# Patient Record
Sex: FEMALE | Race: WHITE | NOT HISPANIC OR LATINO | Employment: OTHER | URBAN - METROPOLITAN AREA
[De-identification: names, ages, dates, MRNs, and addresses within clinical notes are randomized per-mention and may not be internally consistent; named-entity substitution may affect disease eponyms.]

---

## 2017-02-28 ENCOUNTER — OFFICE VISIT (OUTPATIENT)
Dept: PULMONOLOGY | Facility: HOSPICE | Age: 72
End: 2017-02-28
Payer: COMMERCIAL

## 2017-02-28 VITALS
RESPIRATION RATE: 16 BRPM | TEMPERATURE: 98.1 F | SYSTOLIC BLOOD PRESSURE: 118 MMHG | OXYGEN SATURATION: 95 % | WEIGHT: 206 LBS | HEIGHT: 65 IN | HEART RATE: 104 BPM | BODY MASS INDEX: 34.32 KG/M2 | DIASTOLIC BLOOD PRESSURE: 78 MMHG

## 2017-02-28 DIAGNOSIS — I10 ESSENTIAL HYPERTENSION: ICD-10-CM

## 2017-02-28 DIAGNOSIS — J45.909 UNCOMPLICATED ASTHMA, UNSPECIFIED ASTHMA SEVERITY: ICD-10-CM

## 2017-02-28 DIAGNOSIS — I47.10 PAROXYSMAL SUPRAVENTRICULAR TACHYCARDIA: ICD-10-CM

## 2017-02-28 DIAGNOSIS — I27.20 PULMONARY HTN (HCC): ICD-10-CM

## 2017-02-28 DIAGNOSIS — G47.33 OBSTRUCTIVE SLEEP APNEA: ICD-10-CM

## 2017-02-28 DIAGNOSIS — R00.2 PALPITATIONS: ICD-10-CM

## 2017-02-28 PROCEDURE — 1036F TOBACCO NON-USER: CPT | Performed by: NURSE PRACTITIONER

## 2017-02-28 PROCEDURE — G8432 DEP SCR NOT DOC, RNG: HCPCS | Performed by: NURSE PRACTITIONER

## 2017-02-28 PROCEDURE — 3014F SCREEN MAMMO DOC REV: CPT | Mod: 8P | Performed by: NURSE PRACTITIONER

## 2017-02-28 PROCEDURE — 99214 OFFICE O/P EST MOD 30 MIN: CPT | Performed by: NURSE PRACTITIONER

## 2017-02-28 PROCEDURE — G8599 NO ASA/ANTIPLAT THER USE RNG: HCPCS | Performed by: NURSE PRACTITIONER

## 2017-02-28 PROCEDURE — G8419 CALC BMI OUT NRM PARAM NOF/U: HCPCS | Performed by: NURSE PRACTITIONER

## 2017-02-28 PROCEDURE — 3017F COLORECTAL CA SCREEN DOC REV: CPT | Mod: 8P | Performed by: NURSE PRACTITIONER

## 2017-02-28 PROCEDURE — 1101F PT FALLS ASSESS-DOCD LE1/YR: CPT | Mod: 8P | Performed by: NURSE PRACTITIONER

## 2017-02-28 PROCEDURE — 4040F PNEUMOC VAC/ADMIN/RCVD: CPT | Mod: 8P | Performed by: NURSE PRACTITIONER

## 2017-02-28 PROCEDURE — G8484 FLU IMMUNIZE NO ADMIN: HCPCS | Performed by: NURSE PRACTITIONER

## 2017-02-28 RX ORDER — HYDROCODONE BITARTRATE AND ACETAMINOPHEN 5; 325 MG/1; MG/1
TABLET ORAL
Refills: 0 | COMMUNITY
Start: 2017-02-23 | End: 2017-09-07

## 2017-02-28 RX ORDER — CYANOCOBALAMIN 1000 UG/ML
INJECTION, SOLUTION INTRAMUSCULAR; SUBCUTANEOUS
Refills: 0 | COMMUNITY
Start: 2016-12-06

## 2017-02-28 RX ORDER — NAPROXEN 500 MG/1
TABLET ORAL
Refills: 1 | COMMUNITY
Start: 2017-02-23 | End: 2017-09-07

## 2017-02-28 NOTE — PROGRESS NOTES
Chief Complaint   Patient presents with   • Follow-Up     opo results          HPI: This patient is a 71 y.o. female, who presents for followup of asthma and sleep apnea. She has had an extensive evaluation in the past for complaints of exertional dyspnea and cough. There was concern of pulmonary HTN, however R heart cath showed normal PAP. There was also some question of ILD. Her high resolution chest CAT scan was unremarkable. Serologies in 2014 were negative. She is a lifelong nonsmoker. Ultimately, she was started on Symbicort 80/4.5 with subjective improvement in symptoms. PFTs May 2016 are stable and indicated an FEV1 of 1.90 L 80% predicted, FEV1 FVC ratio of 78, DLCO 111% predicted. 6 minute walk showed normal oxygen saturation. Breathing remains stable. Denies symptoms of dyspnea, cough or wheeze. She denies URI since she was last seen. She rarely requires use of rescue inhaler. Her triggers are typically cold weather smoke.    In regards to CHELY, she was using auto CPAP 7-9 cm H2O. Former compliance shows excellent compliance. Repeat overnight oximetry showed borderline saturations. Pressures were empirically increased to 10-14 cm H2O. Compliance download indicates 96% compliance, average use of 6 hours 40 minutes per night, AHI is been reduced to 0.5. Overnight oximetry indicates adequate nocturnal saturation of the basal SPO2 of 90.2%. Overnight oximetry noted some cardiac irregularity. She has seen cardiology regarding this. She feels significantly better with higher CPAP pressures. She feels well rested, denies AM PALACIO, She has been getting supplies regularly.    Past Medical History   Diagnosis Date   • HTN (hypertension)    • GERD (gastroesophageal reflux disease)    • Pulmonary HTN (CMS-HCC)    • Heart burn    • Indigestion    • Hiatus hernia syndrome    • Asthma    • Breath shortness    • Snoring    • Anesthesia      jaw dislocation after bite block   • Arrhythmia      palpitations   • Sleep apnea      " uses cpap   • CHD (coronary heart disease)    • Obesity    • Hiatal hernia        Social History   Substance Use Topics   • Smoking status: Never Smoker    • Smokeless tobacco: Never Used   • Alcohol Use: Not on file      Comment: 2 per week       Family History   Problem Relation Age of Onset   • Stroke Mother        Current medications as of today   Current Outpatient Prescriptions   Medication Sig Dispense Refill   • cyanocobalamin (VITAMIN B-12) 1000 MCG/ML Solution INHECT 1 ML INTRAMUSCULARLY Q 2 WEEKS  0   • hydrocodone-acetaminophen (NORCO) 5-325 MG Tab per tablet TK 1-2 TS PO Q 6 H PRN P  0   • naproxen (NAPROSYN) 500 MG Tab TK 1 T PO BID WITH FOOD  1   • budesonide-formoterol (SYMBICORT) 80-4.5 MCG/ACT Aerosol Inhale 2 Puffs by mouth 2 Times a Day. 3 Inhaler 3   • clobetasol (TEMOVATE) 0.05 % Ointment      • metoprolol SR (TOPROL XL) 50 MG TABLET SR 24 HR Once daily at bedtime. 90 Tab 3   • Cyanocobalamin (VITAMIN B-12 INJ) by Injection route.     • lisinopril (PRINIVIL) 5 MG Tab Take 5 mg by mouth every day.     • CALCIUM PO Take  by mouth.     • MAGNESIUM PO Take  by mouth.     • Coenzyme Q10 (COQ10) 100 MG CAPS Take  by mouth every day.     • ascorbic acid (ASCORBIC ACID) 500 MG TABS Take 500 mg by mouth every day.     • albuterol (PROAIR HFA) 108 (90 BASE) MCG/ACT Aero Soln inhalation aerosol Inhale 2 Puffs by mouth every four hours as needed for Shortness of Breath (wheezing). 1 Inhaler 1   • alprazolam (XANAX) 0.25 MG TABS Take 1 Tab by mouth at bedtime as needed for Sleep. 30 Tab 3   • omeprazole (PRILOSEC) 20 MG CPDR Take 20 mg by mouth as needed.       No current facility-administered medications for this visit.       Allergies: Sulfa drugs    Blood pressure 118/78, pulse 104, temperature 36.7 °C (98.1 °F), resp. rate 16, height 1.651 m (5' 5\"), weight 93.441 kg (206 lb), SpO2 95 %.      ROS:   Constitutional: Denies fevers, chills, night sweats, weight loss or fatigue  HEENT: Denies earache, " difficulty hearing, tinnitus, nasal congestion, hoarseness  Cardiovascular: Denies chest pain, tightness, palpitations, orthopnea or edema  Respiratory: See HPI  Sleep: Denies daytime sleepiness, snoring, apneas, insomnia, morning headaches  GI: Denies heartburn, dysphagia, nausea, abdominal pain, diarrhea or constipation  : Denies frequent urination, hematuria, discharge or painful urination  Musculoskeletal: Left arm in a sling, recent GLF with hairline fracture  Neurological: Denies weakness or headaches  Skin: No rashes    Physical exam:   Appearance: Well-nourished, well-developed, in no acute distress  HEENT: Normocephalic, atraumatic, white sclera, PERRLA, oropharynx clear  Respiratory: no intercostal retractions or accessory muscle use   Lungs auscultation: Clear to auscultation bilaterally  Cardiovascular: Regular rate rhythm no murmurs, rubs or gallops  Gait: Normal  Digits: No clubbing, cyanosis  Motor: No focal deficits  Orientation: Oriented to time, person and place    Diagnosis:  1. Uncomplicated asthma, unspecified asthma severity     2. Obstructive sleep apnea     3. Essential hypertension     4. Paroxysmal supraventricular tachycardia (CMS-HCC)     5. Pulmonary HTN (CMS-HCC)     6. Palpitations         Plan:      1. Continue Symbicort 80/4.5, 2 puffs, twice a day, rinse mouth after use  2. Continue albuterol as needed  3. Continue CPAP nightly  4. Clean mask and tubing weekly  5. Follow-up in 6 months, sooner if needed

## 2017-02-28 NOTE — PATIENT INSTRUCTIONS
1. Continue Symbicort 80/4.5, 2 puffs, twice a day, rinse mouth after use  2. Continue albuterol as needed  3. Continue CPAP nightly  4. Clean mask and tubing weekly  5. Follow-up in 6 months, sooner if needed

## 2017-02-28 NOTE — MR AVS SNAPSHOT
"        Keli Porter   2017 9:20 AM   Office Visit   MRN: [id number]    Department:  [facility]   Dept Phone:  [phone]    Description:  Female : 1945   Provider:  NEL CarmonaRARNOL           Reason for Visit     Follow-Up opo results       Allergies as of 2017     Allergen Noted Reactions    Sulfa Drugs 10/01/2015   Itching, Swelling, Unspecified    Pt. States face redness, itching, swelling and fever      Vital Signs     Blood Pressure Pulse Temperature Respirations Height Weight    118/78 mmHg 104 36.7 °C (98.1 °F) 16 1.651 m (5' 5\") 93.441 kg (206 lb)    Body Mass Index Oxygen Saturation Smoking Status             34.28 kg/m2 95% Never Smoker          Basic Information     Date Of Birth Sex Race Ethnicity Preferred Language    1945 Female White Non- English      Your appointments     Mar 07, 2017 12:40 PM   FOLLOW UP with Melonie Faye A.P.NGary   [facility] (--)    [address]  [facility] [address]   [phone]              Problem List              ICD-10-CM Priority Class Noted - Resolved    HTN (hypertension) I10   Unknown - Present    GERD (gastroesophageal reflux disease) K21.9   Unknown - Present    Pulmonary HTN (CMS-HCC) I27.2   Unknown - Present    Other chest pain R07.89   2013 - Present    Palpitations R00.2   2013 - Present    Fatigue R53.83   2013 - Present    Coronary atherosclerosis of native coronary artery I25.10   2013 - Present    Carotid artery disease (CMS-HCC) I77.9   2013 - Present    Anxiety F41.9   2014 - Present    Asthma J45.909   2016 - Present    Obstructive sleep apnea G47.33   2016 - Present    Paroxysmal supraventricular tachycardia (CMS-HCC) I47.1   2016 - Present      Health Maintenance        Date Due Completion Dates    IMM DTaP/Tdap/Td Vaccine (1 - Tdap) 1963 ---    PAP SMEAR 1965 ---    MAMMOGRAM 1984 ---   " COLONOSCOPY 4/26/1994 ---    IMM ZOSTER VACCINE 4/26/2004 ---    BONE DENSITY 4/26/2009 ---    IMM PNEUMOCOCCAL 65+ (ADULT) LOW/MEDIUM RISK SERIES (1 of 2 - PCV13) 4/26/2009 ---    IMM INFLUENZA (1) 9/1/2016 ---            Current Immunizations     No immunizations on file.      Below and/or attached are the medications your provider expects you to take. Review all of your home medications and newly ordered medications with your provider and/or pharmacist. Follow medication instructions as directed by your provider and/or pharmacist. Please keep your medication list with you and share with your provider. Update the information when medications are discontinued, doses are changed, or new medications (including over-the-counter products) are added; and carry medication information at all times in the event of emergency situations     Allergies:  SULFA DRUGS - Itching,Swelling,Unspecified               Medications  Valid as of: February 28, 2017 -  9:56 AM    Generic Name Brand Name Tablet Size Instructions for use    Albuterol Sulfate (Aero Soln) albuterol 108 (90 BASE) MCG/ACT Inhale 2 Puffs by mouth every four hours as needed for Shortness of Breath (wheezing).        ALPRAZolam (Tab) XANAX 0.25 MG Take 1 Tab by mouth at bedtime as needed for Sleep.        Ascorbic Acid (Tab) ascorbic acid 500 MG Take 500 mg by mouth every day.        Budesonide-Formoterol Fumarate (Aerosol) SYMBICORT 80-4.5 MCG/ACT Inhale 2 Puffs by mouth 2 Times a Day.        Calcium   Take  by mouth.        Clobetasol Propionate (Ointment) TEMOVATE 0.05 %         Coenzyme Q10 (Cap) Coenzyme Q10 100 MG Take  by mouth every day.        Cyanocobalamin   by Injection route.        Cyanocobalamin (Solution) VITAMIN B-12 1000 MCG/ML INHECT 1 ML INTRAMUSCULARLY Q 2 WEEKS        Hydrocodone-Acetaminophen (Tab) NORCO 5-325 MG TK 1-2 TS PO Q 6 H PRN P        Lisinopril (Tab) PRINIVIL 5 MG Take 5 mg by mouth every day.        Magnesium   Take  by mouth.         Metoprolol Succinate (TABLET SR 24 HR) TOPROL XL 50 MG Once daily at bedtime.        Naproxen (Tab) NAPROSYN 500 MG TK 1 T PO BID WITH FOOD        Omeprazole (CAPSULE DELAYED RELEASE) PRILOSEC 20 MG Take 20 mg by mouth as needed.        .                 Medicines prescribed today were sent to:     [facility] - [place], [place] - [address] [facility]    [address] [facility] [address]    Phone: [phone] Fax: [phone]    Open 24 Hours?: No    [facility] [address] [place] [address] [place], [place] - [address] [facility] [address]    [address] [place] [address]    Phone: [phone] Fax: [phone]    Open 24 Hours?: No      Medication refill instructions:       If your prescription bottle indicates you have medication refills left, it is not necessary to call your provider’s office. Please contact your pharmacy and they will refill your medication.    If your prescription bottle indicates you do not have any refills left, you may request refills at any time through one of the following ways: The online [employer]ot[employer] system (except Urgent Care), by calling your provider’s office, or by asking your pharmacy to contact your provider’s office with a refill request. Medication refills are processed only during regular business hours and may not be available until the next business day. Your provider may request additional information or to have a follow-up visit with you prior to refilling your medication.   *Please Note: Medication refills are assigned a new Rx number when refilled electronically. Your pharmacy may indicate that no refills were authorized even though a new prescription for the same medication is available at the pharmacy. Please request the medicine by name with the pharmacy before contacting your provider for a refill.        Other Notes About Your Plan     [facility] Ph. [phone] Fax. [phone]           Fot[employer] Access Code: Activation code not  generated  Current MyChart Status: Active

## 2017-03-28 ENCOUNTER — OFFICE VISIT (OUTPATIENT)
Dept: CARDIOLOGY | Facility: MEDICAL CENTER | Age: 72
End: 2017-03-28
Payer: COMMERCIAL

## 2017-03-28 VITALS
OXYGEN SATURATION: 94 % | BODY MASS INDEX: 34.49 KG/M2 | WEIGHT: 207 LBS | SYSTOLIC BLOOD PRESSURE: 132 MMHG | HEART RATE: 95 BPM | HEIGHT: 65 IN | DIASTOLIC BLOOD PRESSURE: 78 MMHG

## 2017-03-28 DIAGNOSIS — I47.10 PAROXYSMAL SVT (SUPRAVENTRICULAR TACHYCARDIA): ICD-10-CM

## 2017-03-28 DIAGNOSIS — I25.10 ATHEROSCLEROSIS OF NATIVE CORONARY ARTERY OF NATIVE HEART WITHOUT ANGINA PECTORIS: ICD-10-CM

## 2017-03-28 DIAGNOSIS — R00.2 PALPITATIONS: ICD-10-CM

## 2017-03-28 DIAGNOSIS — I10 ESSENTIAL HYPERTENSION: ICD-10-CM

## 2017-03-28 PROCEDURE — G8484 FLU IMMUNIZE NO ADMIN: HCPCS | Performed by: NURSE PRACTITIONER

## 2017-03-28 PROCEDURE — 99214 OFFICE O/P EST MOD 30 MIN: CPT | Performed by: NURSE PRACTITIONER

## 2017-03-28 PROCEDURE — 1101F PT FALLS ASSESS-DOCD LE1/YR: CPT | Mod: 8P | Performed by: NURSE PRACTITIONER

## 2017-03-28 PROCEDURE — 93000 ELECTROCARDIOGRAM COMPLETE: CPT | Performed by: NURSE PRACTITIONER

## 2017-03-28 PROCEDURE — 3017F COLORECTAL CA SCREEN DOC REV: CPT | Mod: 8P | Performed by: NURSE PRACTITIONER

## 2017-03-28 PROCEDURE — 4040F PNEUMOC VAC/ADMIN/RCVD: CPT | Mod: 8P | Performed by: NURSE PRACTITIONER

## 2017-03-28 PROCEDURE — G8419 CALC BMI OUT NRM PARAM NOF/U: HCPCS | Performed by: NURSE PRACTITIONER

## 2017-03-28 PROCEDURE — G8432 DEP SCR NOT DOC, RNG: HCPCS | Performed by: NURSE PRACTITIONER

## 2017-03-28 PROCEDURE — G8599 NO ASA/ANTIPLAT THER USE RNG: HCPCS | Performed by: NURSE PRACTITIONER

## 2017-03-28 PROCEDURE — 3014F SCREEN MAMMO DOC REV: CPT | Mod: 8P | Performed by: NURSE PRACTITIONER

## 2017-03-28 PROCEDURE — 1036F TOBACCO NON-USER: CPT | Performed by: NURSE PRACTITIONER

## 2017-03-28 ASSESSMENT — ENCOUNTER SYMPTOMS
FEVER: 0
SEIZURES: 0
CHILLS: 0
HEARTBURN: 0
MUSCULOSKELETAL NEGATIVE: 1
PND: 0
DIZZINESS: 0
ABDOMINAL PAIN: 0
BLURRED VISION: 0
FOCAL WEAKNESS: 0
PALPITATIONS: 0
WHEEZING: 0
COUGH: 0
SORE THROAT: 0
LOSS OF CONSCIOUSNESS: 0
WEIGHT LOSS: 0
SPEECH CHANGE: 0
HEADACHES: 0
FLANK PAIN: 0
NAUSEA: 0
CLAUDICATION: 0
SPUTUM PRODUCTION: 0
PSYCHIATRIC NEGATIVE: 1
VOMITING: 0
SHORTNESS OF BREATH: 0
DOUBLE VISION: 0
ORTHOPNEA: 0

## 2017-03-28 NOTE — MR AVS SNAPSHOT
"        Keli Porter   3/28/2017 2:40 PM   Office Visit   MRN: [id number]    Department:  [facility]   Dept Phone:  [phone]    Description:  Female : 1945   Provider:  ROSA SegoviaPARNOL           Reason for Visit     Follow-Up           Allergies as of 3/28/2017     Allergen Noted Reactions    Sulfa Drugs 10/01/2015   Itching, Swelling, Unspecified    Pt. States face redness, itching, swelling and fever      You were diagnosed with     Essential hypertension   [4154088]       Palpitations   [785.1.ICD-9-CM]       Paroxysmal SVT (supraventricular tachycardia) (CMS-HCC)   [187578]       Atherosclerosis of native coronary artery of native heart without angina pectoris   [3018675]       Paroxysmal supraventricular tachycardia (CMS-HCC)   [427.0.ICD-9-CM]         Vital Signs     Blood Pressure Pulse Height Weight Body Mass Index Oxygen Saturation    132/78 mmHg 95 1.651 m (5' 5\") 93.895 kg (207 lb) 34.45 kg/m2 94%    Smoking Status                   Never Smoker            Basic Information     Date Of Birth Sex Race Ethnicity Preferred Language    1945 Female White Non- English      Problem List              ICD-10-CM Priority Class Noted - Resolved    HTN (hypertension) I10   Unknown - Present    GERD (gastroesophageal reflux disease) K21.9   Unknown - Present    Pulmonary HTN (CMS-HCC) I27.2   Unknown - Present    Other chest pain R07.89   2013 - Present    Palpitations R00.2   2013 - Present    Fatigue R53.83   2013 - Present    Coronary atherosclerosis of native coronary artery I25.10   2013 - Present    Carotid artery disease (CMS-HCC) I77.9   2013 - Present    Anxiety F41.9   2014 - Present    Asthma J45.909   2016 - Present    Obstructive sleep apnea G47.33   2016 - Present    Paroxysmal supraventricular tachycardia (CMS-HCC) I47.1   2016 - Present      Health Maintenance        Date Due Completion Dates    IMM DTaP/Tdap/Td Vaccine (1 " - Tdap) 4/26/1963 ---    PAP SMEAR 4/26/1965 ---    MAMMOGRAM 4/26/1984 ---    COLONOSCOPY 4/26/1994 ---    IMM ZOSTER VACCINE 4/26/2004 ---    BONE DENSITY 4/26/2009 ---    IMM PNEUMOCOCCAL 65+ (ADULT) LOW/MEDIUM RISK SERIES (1 of 2 - PCV13) 4/26/2009 ---    IMM INFLUENZA (1) 9/1/2016 ---            Results       Current Immunizations     No immunizations on file.      Below and/or attached are the medications your provider expects you to take. Review all of your home medications and newly ordered medications with your provider and/or pharmacist. Follow medication instructions as directed by your provider and/or pharmacist. Please keep your medication list with you and share with your provider. Update the information when medications are discontinued, doses are changed, or new medications (including over-the-counter products) are added; and carry medication information at all times in the event of emergency situations     Allergies:  SULFA DRUGS - Itching,Swelling,Unspecified               Medications  Valid as of: March 28, 2017 -  3:27 PM    Generic Name Brand Name Tablet Size Instructions for use    Albuterol Sulfate (Aero Soln) albuterol 108 (90 BASE) MCG/ACT Inhale 2 Puffs by mouth every four hours as needed for Shortness of Breath (wheezing).        ALPRAZolam (Tab) XANAX 0.25 MG Take 1 Tab by mouth at bedtime as needed for Sleep.        Ascorbic Acid (Tab) ascorbic acid 500 MG Take 500 mg by mouth every day.        Budesonide-Formoterol Fumarate (Aerosol) SYMBICORT 80-4.5 MCG/ACT Inhale 2 Puffs by mouth 2 Times a Day.        Calcium   Take  by mouth.        Clobetasol Propionate (Ointment) TEMOVATE 0.05 %         Coenzyme Q10 (Cap) Coenzyme Q10 100 MG Take  by mouth every day.        Cyanocobalamin   by Injection route.        Cyanocobalamin (Solution) VITAMIN B-12 1000 MCG/ML INHECT 1 ML INTRAMUSCULARLY Q 2 WEEKS        Hydrocodone-Acetaminophen (Tab) NORCO 5-325 MG TK 1-2 TS PO Q 6 H PRN P         Lisinopril (Tab) PRINIVIL 5 MG Take 5 mg by mouth every day.        Magnesium   Take  by mouth.        Metoprolol Succinate (TABLET SR 24 HR) TOPROL XL 50 MG Once daily at bedtime.        Naproxen (Tab) NAPROSYN 500 MG TK 1 T PO BID WITH FOOD        Omeprazole (CAPSULE DELAYED RELEASE) PRILOSEC 20 MG Take 20 mg by mouth as needed.        .                 Medicines prescribed today were sent to:     [facility] - [place], [place] - [address] [facility]    [address] [facility] [address]    Phone: [phone] Fax: [phone]    Open 24 Hours?: No    [facility] [address] [place] [address] [place], [place] - [address] [facility]    [address] [place] [address]    Phone: [phone] Fax: [phone]    Open 24 Hours?: No      Medication refill instructions:       If your prescription bottle indicates you have medication refills left, it is not necessary to call your provider’s office. Please contact your pharmacy and they will refill your medication.    If your prescription bottle indicates you do not have any refills left, you may request refills at any time through one of the following ways: The online [employer] system (except Urgent Care), by calling your provider’s office, or by asking your pharmacy to contact your provider’s office with a refill request. Medication refills are processed only during regular business hours and may not be available until the next business day. Your provider may request additional information or to have a follow-up visit with you prior to refilling your medication.   *Please Note: Medication refills are assigned a new Rx number when refilled electronically. Your pharmacy may indicate that no refills were authorized even though a new prescription for the same medication is available at the pharmacy. Please request the medicine by name with the pharmacy before contacting your provider for a refill.        Other Notes About Your Plan     DME-  [facility] Ph. [phone] Fax. [phone]           MyChart Access Code: Activation code not generated  Current MyChart Status: Active

## 2017-03-28 NOTE — Clinical Note
[facility]   [address] [address], [facility] [address]  [place] [address]  Phone: [phone]  Fax: [phone]              Keli Porter  1945    Encounter Date: 3/28/2017    OCTAVIO Segovia          PROGRESS NOTE:  Subjective:   Keli Porter is a 71 y.o. female who presents today for review of her PSVT, secondary hypertension, obstructive sleep apnea and chest pain.  She is followed by [facility] for her PAH and recent testing demonstrated a FEV1 at 80% other indices were normal her DLCO was 111%.  She is overweight at least 50-60# and deconditioned.  Has H/O GERD and she has noted nausea and dizziness with yoga. She is sedentary due to fracture of her arm after slipping on the ice in January. She also contracted Influenza and then Noro virus. She is back on her prudent diet attempting to limit sugar and utilization of only good fats. She is followed by [facility] and recent OPO at 10-11cms on her CPAP revealed saturations in the 90% range.    In 2014 she underwent right and left heart cath. Her PAP were mild as compared to moderate 50mmHg by echo.  She had normal coronary arteries.  She still with activity gets some sharp chest pressure.        Keli Porter is a 71 y.o. female who presents today for review of her EKG completed in Urgent care and follow up in regard t her PAH.  She is followed by [facility] for her PAH and recent testing demonstrated a FEV1 at 80% other indices were normal her DLCO was 111%.  She is overweight at least 50-60# and deconditioned.  Has H/O GERD and she has noted nausea and dizziness with yoga. She is sedentary due to increased SOB , nausea and some lightheadedness. She went to Urgent care last week with complaints of weakness and erratic heart rhythm. She had not noted this previously. EKG demonstrates isolated APC's.  In 2014 she underwent right and left heart cath. Her PAP were mild as compared to moderate 50mmHg by echo.  She had normal  coronary arteries.  She still with activity gets some sharp chest pressure.    6/22/16 Carotid Ultrasound  FINDINGS   Right carotid - Very mild plaque of the carotid bifurcation. Doppler    velocities are normal.      Left carotid - Very mild plaque of the carotid bifurcation. Doppler    velocities are normal.     Bilateral subclavian and vertebral artery waveforms are antegrade and    waveforms are normal in character and velocity.     6/22/16    Transthoracic  Echo Report      Echocardiography Laboratory    CONCLUSIONS  Normal left ventricular systolic function.  Left ventricular ejection fraction is visually estimated to be 65%.  Grade I diastolic dysfunction.  The right ventricle was normal in size and function.  No significant valve disease or flow abnormalities.   No prior study is available for comparison.   FINDINGS   Right carotid - Very mild plaque of the carotid bifurcation. Doppler    velocities are normal.          Past Medical History   Diagnosis Date   • HTN (hypertension)    • GERD (gastroesophageal reflux disease)    • Pulmonary HTN (CMS-HCC)    • Heart burn    • Indigestion    • Hiatus hernia syndrome    • Asthma    • Breath shortness    • Snoring    • Anesthesia      jaw dislocation after bite block   • Arrhythmia      palpitations   • Sleep apnea      uses cpap   • CHD (coronary heart disease)    • Obesity    • Hiatal hernia      Past Surgical History   Procedure Laterality Date   • Rula by laparoscopy     • Endoscopy     • Colonoscopy     • Recovery  10/15/2014     Performed by Cath-Recovery Surgery at SURGERY SAME DAY [facility] ORS   • Lumpectomy     • Tonsillectomy       Family History   Problem Relation Age of Onset   • Stroke Mother      History   Smoking status   • Never Smoker    Smokeless tobacco   • Never Used     Allergies   Allergen Reactions   • Sulfa Drugs Itching, Swelling and Unspecified     Pt. States face redness, itching, swelling and fever     Outpatient Encounter Prescriptions  as of 3/28/2017   Medication Sig Dispense Refill   • cyanocobalamin (VITAMIN B-12) 1000 MCG/ML Solution INHECT 1 ML INTRAMUSCULARLY Q 2 WEEKS  0   • hydrocodone-acetaminophen (NORCO) 5-325 MG Tab per tablet TK 1-2 TS PO Q 6 H PRN P  0   • naproxen (NAPROSYN) 500 MG Tab TK 1 T PO BID WITH FOOD  1   • budesonide-formoterol (SYMBICORT) 80-4.5 MCG/ACT Aerosol Inhale 2 Puffs by mouth 2 Times a Day. 3 Inhaler 3   • clobetasol (TEMOVATE) 0.05 % Ointment      • metoprolol SR (TOPROL XL) 50 MG TABLET SR 24 HR Once daily at bedtime. (Patient taking differently: 25 mg. Once daily at bedtime.) 90 Tab 3   • Cyanocobalamin (VITAMIN B-12 INJ) by Injection route.     • lisinopril (PRINIVIL) 5 MG Tab Take 5 mg by mouth every day.     • albuterol (PROAIR HFA) 108 (90 BASE) MCG/ACT Aero Soln inhalation aerosol Inhale 2 Puffs by mouth every four hours as needed for Shortness of Breath (wheezing). 1 Inhaler 1   • CALCIUM PO Take  by mouth.     • MAGNESIUM PO Take  by mouth.     • alprazolam (XANAX) 0.25 MG TABS Take 1 Tab by mouth at bedtime as needed for Sleep. 30 Tab 3   • omeprazole (PRILOSEC) 20 MG CPDR Take 20 mg by mouth as needed.     • Coenzyme Q10 (COQ10) 100 MG CAPS Take  by mouth every day.     • ascorbic acid (ASCORBIC ACID) 500 MG TABS Take 500 mg by mouth every day.       No facility-administered encounter medications on file as of 3/28/2017.     Review of Systems   Constitutional: Negative for fever, chills, weight loss and malaise/fatigue.   HENT: Negative for congestion and sore throat.    Eyes: Negative for blurred vision and double vision.   Respiratory: Negative for cough, sputum production, shortness of breath and wheezing.    Cardiovascular: Negative for chest pain, palpitations, orthopnea, claudication, leg swelling and PND.   Gastrointestinal: Negative for heartburn, nausea, vomiting and abdominal pain.   Genitourinary: Negative for dysuria, frequency and flank pain.   Musculoskeletal: Negative.    Skin:  "Negative.    Neurological: Negative for dizziness, speech change, focal weakness, seizures, loss of consciousness and headaches.   Endo/Heme/Allergies: Negative.    Psychiatric/Behavioral: Negative.         Objective:   /78 mmHg  Pulse 95  Ht 1.651 m (5' 5\")  Wt 93.895 kg (207 lb)  BMI 34.45 kg/m2  SpO2 94%    Physical Exam   Constitutional: She is oriented to person, place, and time. She appears well-developed and well-nourished.   HENT:   Head: Normocephalic and atraumatic.   Neck: Normal range of motion. Neck supple. No thyromegaly present.   Cardiovascular: Normal rate, regular rhythm, normal heart sounds and intact distal pulses.  Exam reveals no gallop and no friction rub.    No murmur heard.  Pulmonary/Chest: Effort normal and breath sounds normal. No respiratory distress. She has no wheezes. She has no rales. She exhibits no tenderness.   Abdominal: Soft. Bowel sounds are normal. She exhibits no distension. There is no tenderness. There is no guarding.   Musculoskeletal: Normal range of motion. She exhibits no edema.   Neurological: She is alert and oriented to person, place, and time.   Skin: Skin is dry.   Psychiatric: She has a normal mood and affect.       Assessment:     1. Essential hypertension  EKG   2. Palpitations  EKG   3. Paroxysmal SVT (supraventricular tachycardia) (CMS-HCC)  EKG   4. Atherosclerosis of native coronary artery of native heart without angina pectoris         Medical Decision Making:  Today's Assessment / Status / Plan:     1. HBP stable and controlled.  2. Palpitations and PSVT quiescent on current dose of Metoprolol XL 25 mgs at HS.  3. Minimal CAD per cath results. PAH.  RTC in 6 months to see DR Ya as new patient.    Collaborating MD Ya.        Fam Reed M.D.  [address]  [address]  [facility] [address]  VIA Facsimile: [phone]                   "

## 2017-03-28 NOTE — PROGRESS NOTES
Subjective:   Keli Porter is a 71 y.o. female who presents today for review of her PSVT, secondary hypertension, obstructive sleep apnea and chest pain.  She is followed by [facility] for her PAH and recent testing demonstrated a FEV1 at 80% other indices were normal her DLCO was 111%.  She is overweight at least 50-60# and deconditioned.  Has H/O GERD and she has noted nausea and dizziness with yoga. She is sedentary due to fracture of her arm after slipping on the ice in January. She also contracted Influenza and then Noro virus. She is back on her prudent diet attempting to limit sugar and utilization of only good fats. She is followed by [facility] and recent OPO at 10-11cms on her CPAP revealed saturations in the 90% range.    In 2014 she underwent right and left heart cath. Her PAP were mild as compared to moderate 50mmHg by echo.  She had normal coronary arteries.  She still with activity gets some sharp chest pressure.        Keli Porter is a 71 y.o. female who presents today for review of her EKG completed in Urgent care and follow up in regard t her PAH.  She is followed by [facility] for her PAH and recent testing demonstrated a FEV1 at 80% other indices were normal her DLCO was 111%.  She is overweight at least 50-60# and deconditioned.  Has H/O GERD and she has noted nausea and dizziness with yoga. She is sedentary due to increased SOB , nausea and some lightheadedness. She went to Urgent care last week with complaints of weakness and erratic heart rhythm. She had not noted this previously. EKG demonstrates isolated APC's.  In 2014 she underwent right and left heart cath. Her PAP were mild as compared to moderate 50mmHg by echo.  She had normal coronary arteries.  She still with activity gets some sharp chest pressure.    6/22/16 Carotid Ultrasound  FINDINGS   Right carotid - Very mild plaque of the carotid bifurcation. Doppler    velocities are normal.      Left carotid - Very mild plaque of the carotid  bifurcation. Doppler    velocities are normal.     Bilateral subclavian and vertebral artery waveforms are antegrade and    waveforms are normal in character and velocity.     6/22/16    Transthoracic  Echo Report      Echocardiography Laboratory    CONCLUSIONS  Normal left ventricular systolic function.  Left ventricular ejection fraction is visually estimated to be 65%.  Grade I diastolic dysfunction.  The right ventricle was normal in size and function.  No significant valve disease or flow abnormalities.   No prior study is available for comparison.   FINDINGS   Right carotid - Very mild plaque of the carotid bifurcation. Doppler    velocities are normal.          Past Medical History   Diagnosis Date   • HTN (hypertension)    • GERD (gastroesophageal reflux disease)    • Pulmonary HTN (CMS-HCC)    • Heart burn    • Indigestion    • Hiatus hernia syndrome    • Asthma    • Breath shortness    • Snoring    • Anesthesia      jaw dislocation after bite block   • Arrhythmia      palpitations   • Sleep apnea      uses cpap   • CHD (coronary heart disease)    • Obesity    • Hiatal hernia      Past Surgical History   Procedure Laterality Date   • Rula by laparoscopy     • Endoscopy     • Colonoscopy     • Recovery  10/15/2014     Performed by Cath-Recovery Surgery at SURGERY SAME DAY [facility] ORS   • Lumpectomy     • Tonsillectomy       Family History   Problem Relation Age of Onset   • Stroke Mother      History   Smoking status   • Never Smoker    Smokeless tobacco   • Never Used     Allergies   Allergen Reactions   • Sulfa Drugs Itching, Swelling and Unspecified     Pt. States face redness, itching, swelling and fever     Outpatient Encounter Prescriptions as of 3/28/2017   Medication Sig Dispense Refill   • cyanocobalamin (VITAMIN B-12) 1000 MCG/ML Solution INHECT 1 ML INTRAMUSCULARLY Q 2 WEEKS  0   • hydrocodone-acetaminophen (NORCO) 5-325 MG Tab per tablet TK 1-2 TS PO Q 6 H PRN P  0   • naproxen (NAPROSYN) 500  "MG Tab TK 1 T PO BID WITH FOOD  1   • budesonide-formoterol (SYMBICORT) 80-4.5 MCG/ACT Aerosol Inhale 2 Puffs by mouth 2 Times a Day. 3 Inhaler 3   • clobetasol (TEMOVATE) 0.05 % Ointment      • metoprolol SR (TOPROL XL) 50 MG TABLET SR 24 HR Once daily at bedtime. (Patient taking differently: 25 mg. Once daily at bedtime.) 90 Tab 3   • Cyanocobalamin (VITAMIN B-12 INJ) by Injection route.     • lisinopril (PRINIVIL) 5 MG Tab Take 5 mg by mouth every day.     • albuterol (PROAIR HFA) 108 (90 BASE) MCG/ACT Aero Soln inhalation aerosol Inhale 2 Puffs by mouth every four hours as needed for Shortness of Breath (wheezing). 1 Inhaler 1   • CALCIUM PO Take  by mouth.     • MAGNESIUM PO Take  by mouth.     • alprazolam (XANAX) 0.25 MG TABS Take 1 Tab by mouth at bedtime as needed for Sleep. 30 Tab 3   • omeprazole (PRILOSEC) 20 MG CPDR Take 20 mg by mouth as needed.     • Coenzyme Q10 (COQ10) 100 MG CAPS Take  by mouth every day.     • ascorbic acid (ASCORBIC ACID) 500 MG TABS Take 500 mg by mouth every day.       No facility-administered encounter medications on file as of 3/28/2017.     Review of Systems   Constitutional: Negative for fever, chills, weight loss and malaise/fatigue.   HENT: Negative for congestion and sore throat.    Eyes: Negative for blurred vision and double vision.   Respiratory: Negative for cough, sputum production, shortness of breath and wheezing.    Cardiovascular: Negative for chest pain, palpitations, orthopnea, claudication, leg swelling and PND.   Gastrointestinal: Negative for heartburn, nausea, vomiting and abdominal pain.   Genitourinary: Negative for dysuria, frequency and flank pain.   Musculoskeletal: Negative.    Skin: Negative.    Neurological: Negative for dizziness, speech change, focal weakness, seizures, loss of consciousness and headaches.   Endo/Heme/Allergies: Negative.    Psychiatric/Behavioral: Negative.         Objective:   /78 mmHg  Pulse 95  Ht 1.651 m (5' 5\")  Wt " 93.895 kg (207 lb)  BMI 34.45 kg/m2  SpO2 94%    Physical Exam   Constitutional: She is oriented to person, place, and time. She appears well-developed and well-nourished.   HENT:   Head: Normocephalic and atraumatic.   Neck: Normal range of motion. Neck supple. No thyromegaly present.   Cardiovascular: Normal rate, regular rhythm, normal heart sounds and intact distal pulses.  Exam reveals no gallop and no friction rub.    No murmur heard.  Pulmonary/Chest: Effort normal and breath sounds normal. No respiratory distress. She has no wheezes. She has no rales. She exhibits no tenderness.   Abdominal: Soft. Bowel sounds are normal. She exhibits no distension. There is no tenderness. There is no guarding.   Musculoskeletal: Normal range of motion. She exhibits no edema.   Neurological: She is alert and oriented to person, place, and time.   Skin: Skin is dry.   Psychiatric: She has a normal mood and affect.       Assessment:     1. Essential hypertension  EKG   2. Palpitations  EKG   3. Paroxysmal SVT (supraventricular tachycardia) (CMS-HCC)  EKG   4. Atherosclerosis of native coronary artery of native heart without angina pectoris         Medical Decision Making:  Today's Assessment / Status / Plan:     1. HBP stable and controlled.  2. Palpitations and PSVT quiescent on current dose of Metoprolol XL 25 mgs at HS.  3. Minimal CAD per cath results. PAH.  RTC in 6 months to see DR Ya as new patient.    Collaborating MD Ya.

## 2017-03-29 LAB — EKG IMPRESSION: NORMAL

## 2017-06-06 ENCOUNTER — TELEPHONE (OUTPATIENT)
Dept: PULMONOLOGY | Facility: HOSPICE | Age: 72
End: 2017-06-06

## 2017-06-06 DIAGNOSIS — G47.33 OBSTRUCTIVE SLEEP APNEA: ICD-10-CM

## 2017-06-21 ENCOUNTER — TELEPHONE (OUTPATIENT)
Dept: PULMONOLOGY | Facility: HOSPICE | Age: 72
End: 2017-06-21

## 2017-06-21 DIAGNOSIS — G47.33 OSA (OBSTRUCTIVE SLEEP APNEA): ICD-10-CM

## 2017-07-25 ENCOUNTER — OFFICE VISIT (OUTPATIENT)
Dept: PULMONOLOGY | Facility: HOSPICE | Age: 72
End: 2017-07-25
Payer: COMMERCIAL

## 2017-07-25 VITALS
HEIGHT: 65 IN | HEART RATE: 96 BPM | BODY MASS INDEX: 33.82 KG/M2 | SYSTOLIC BLOOD PRESSURE: 110 MMHG | WEIGHT: 203 LBS | RESPIRATION RATE: 15 BRPM | DIASTOLIC BLOOD PRESSURE: 70 MMHG | OXYGEN SATURATION: 98 %

## 2017-07-25 DIAGNOSIS — J45.30 MILD PERSISTENT ASTHMA WITHOUT COMPLICATION: ICD-10-CM

## 2017-07-25 DIAGNOSIS — I10 ESSENTIAL HYPERTENSION: ICD-10-CM

## 2017-07-25 DIAGNOSIS — G47.33 OBSTRUCTIVE SLEEP APNEA: ICD-10-CM

## 2017-07-25 PROCEDURE — 99213 OFFICE O/P EST LOW 20 MIN: CPT | Performed by: NURSE PRACTITIONER

## 2017-07-25 NOTE — MR AVS SNAPSHOT
"        Keli Porter   2017 11:20 AM   Office Visit   MRN: [id number]    Department:  [facility]   Dept Phone:  [phone]    Description:  Female : 1945   Provider:  KIRNA Carmona           Reason for Visit     Follow-Up Complience DL      Allergies as of 2017     Allergen Noted Reactions    Sulfa Drugs 10/01/2015   Itching, Swelling, Unspecified    Pt. States face redness, itching, swelling and fever      Vital Signs     Blood Pressure Pulse Respirations Height Weight Body Mass Index    110/70 mmHg 96 15 1.651 m (5' 5\") 92.08 kg (203 lb) 33.78 kg/m2    Oxygen Saturation Smoking Status                98% Never Smoker           Basic Information     Date Of Birth Sex Race Ethnicity Preferred Language    1945 Female White Non- English      Your appointments     2017  2:40 PM   FOLLOW UP with OCTAVIO Segovia   [facility] (--)    [address]  [place] [address]   [phone]            Sep 25, 2017  9:00 AM   FOLLOW UP with Kathie Ya M.D.   [facility] (--)    [address]  [place] [address]   [phone]            Rojas 15, 2018  9:20 AM   Established Patient Pul with KIRAN Carmona   [facility] (--)    [address]  [place] [id number] [phone]              Problem List              ICD-10-CM Priority Class Noted - Resolved    HTN (hypertension) I10   Unknown - Present    GERD (gastroesophageal reflux disease) K21.9   Unknown - Present    Pulmonary HTN (CMS-HCC) I27.2   Unknown - Present    Other chest pain R07.89   2013 - Present    Palpitations R00.2   2013 - Present    Fatigue R53.83   2013 - Present    Coronary atherosclerosis of native coronary artery I25.10   2013 - Present    Carotid artery disease (CMS-HCC) I77.9   2013 - Present    Anxiety F41.9   " 12/31/2014 - Present    Asthma J45.909   4/14/2016 - Present    Obstructive sleep apnea G47.33   5/11/2016 - Present    Paroxysmal supraventricular tachycardia (CMS-HCC) I47.1   12/6/2016 - Present      Health Maintenance        Date Due Completion Dates    IMM DTaP/Tdap/Td Vaccine (1 - Tdap) 4/26/1963 ---    PAP SMEAR 4/26/1965 ---    MAMMOGRAM 4/26/1984 ---    COLONOSCOPY 4/26/1994 ---    IMM ZOSTER VACCINE 4/26/2004 ---    BONE DENSITY 4/26/2009 ---    IMM PNEUMOCOCCAL 65+ (ADULT) LOW/MEDIUM RISK SERIES (1 of 2 - PCV13) 4/26/2009 ---    IMM INFLUENZA (1) 9/1/2017 ---            Current Immunizations     Influenza TIV (IM) 10/28/2016      Below and/or attached are the medications your provider expects you to take. Review all of your home medications and newly ordered medications with your provider and/or pharmacist. Follow medication instructions as directed by your provider and/or pharmacist. Please keep your medication list with you and share with your provider. Update the information when medications are discontinued, doses are changed, or new medications (including over-the-counter products) are added; and carry medication information at all times in the event of emergency situations     Allergies:  SULFA DRUGS - Itching,Swelling,Unspecified               Medications  Valid as of: July 25, 2017 - 12:05 PM    Generic Name Brand Name Tablet Size Instructions for use    Albuterol Sulfate (Aero Soln) albuterol 108 (90 BASE) MCG/ACT Inhale 2 Puffs by mouth every four hours as needed for Shortness of Breath (wheezing).        ALPRAZolam (Tab) XANAX 0.25 MG Take 1 Tab by mouth at bedtime as needed for Sleep.        Ascorbic Acid (Tab) ascorbic acid 500 MG Take 500 mg by mouth every day.        Budesonide-Formoterol Fumarate (Aerosol) SYMBICORT 80-4.5 MCG/ACT Inhale 2 Puffs by mouth 2 Times a Day.        Calcium   Take  by mouth.        Clobetasol Propionate (Ointment) TEMOVATE 0.05 %         Coenzyme Q10 (Cap)  Coenzyme Q10 100 MG Take  by mouth every day.        Cyanocobalamin   by Injection route.        Cyanocobalamin (Solution) VITAMIN B-12 1000 MCG/ML INHECT 1 ML INTRAMUSCULARLY Q 2 WEEKS        Hydrocodone-Acetaminophen (Tab) NORCO 5-325 MG TK 1-2 TS PO Q 6 H PRN P        Lisinopril (Tab) PRINIVIL 5 MG Take 5 mg by mouth every day.        Magnesium   Take  by mouth.        Metoprolol Succinate (TABLET SR 24 HR) TOPROL XL 50 MG Once daily at bedtime.        Naproxen (Tab) NAPROSYN 500 MG TK 1 T PO BID WITH FOOD        Omeprazole (CAPSULE DELAYED RELEASE) PRILOSEC 20 MG Take 20 mg by mouth as needed.        .                 Medicines prescribed today were sent to:     [facility] - [address] [address]    Phone: [phone] Fax: [phone]    Open 24 Hours?: No    [employer] [facility] [address] [place] [address] [place] [address] [address] AT [facility] [place] [address] [place] [address]    Phone: [phone] Fax: [phone]    Open 24 Hours?: No      Medication refill instructions:       If your prescription bottle indicates you have medication refills left, it is not necessary to call your provider’s office. Please contact your pharmacy and they will refill your medication.    If your prescription bottle indicates you do not have any refills left, you may request refills at any time through one of the following ways: The online [employer] system (except Urgent Care), by calling your provider’s office, or by asking your pharmacy to contact your provider’s office with a refill request. Medication refills are processed only during regular business hours and may not be available until the next business day. Your provider may request additional information or to have a follow-up visit with you prior to refilling your medication.   *Please Note: Medication refills are assigned a new Rx number when refilled electronically. Your pharmacy may  indicate that no refills were authorized even though a new prescription for the same medication is available at the pharmacy. Please request the medicine by name with the pharmacy before contacting your provider for a refill.        Other Notes About Your Plan     [facility] Ph. [phone] Fax. [phone]           MyChart Access Code: Activation code not generated  Current MyChart Status: Active

## 2017-07-25 NOTE — PROGRESS NOTES
Chief Complaint   Patient presents with   • Follow-Up     Complience DL         HPI: This patient is a 72 y.o. female, who presents for follow-up mild asthma and CHELY. She is a never smoker. Medical history includes HTN, GERD. Questionable pulmonary hypertension however right heart catheter showed normal PAP.    In regards to asthma/mild obstructive airway, PFTs indicate an FEV1 of 1.90 L 80% predicted, FEV1 FVC ratio of 78, DLCO 111% predicted. At one point there was some question of ILD, Former HRCT was unremarkable. Former serologies were negative. Patient is compliant with Symbicort 80/4.5, 2 puffs, twice a day with subjective improvement in symptoms. She still has mild dyspnea. Denies other respiratory complaints. Denies URI since she was last seen. She feels she breaths easier at lower altitudes. Her  has samples of Breo 100/25,  which were not effective for him. She is wondering if she can try these.    In regards to CHELY, she is compliant with auto CPAP 10-14 cm H2O. Compliance report shows 83% use over the past 30 days, average use of almost 7 hours per night, AHI has been reduced to 0.2. Former overnight oximetry indicates adequate nocturnal saturation. She continues to have some fatigue, but believes fatigue is related to multiple issues. She denies a.m. headaches. Mask and pressures are comfortable. She denies frequent nocturnal awakenings or resuscitative gasps.     Past Medical History   Diagnosis Date   • HTN (hypertension)    • GERD (gastroesophageal reflux disease)    • Pulmonary HTN (CMS-HCC)    • Heart burn    • Indigestion    • Hiatus hernia syndrome    • Asthma    • Breath shortness    • Snoring    • Anesthesia      jaw dislocation after bite block   • Arrhythmia      palpitations   • Sleep apnea      uses cpap   • CHD (coronary heart disease)    • Obesity    • Hiatal hernia        Social History   Substance Use Topics   • Smoking status: Never Smoker    • Smokeless tobacco: Never Used   •  "Alcohol Use: Yes      Comment: occ.       Family History   Problem Relation Age of Onset   • Stroke Mother        Current medications as of today   Current Outpatient Prescriptions   Medication Sig Dispense Refill   • cyanocobalamin (VITAMIN B-12) 1000 MCG/ML Solution INHECT 1 ML INTRAMUSCULARLY Q 2 WEEKS  0   • naproxen (NAPROSYN) 500 MG Tab TK 1 T PO BID WITH FOOD  1   • budesonide-formoterol (SYMBICORT) 80-4.5 MCG/ACT Aerosol Inhale 2 Puffs by mouth 2 Times a Day. 3 Inhaler 3   • metoprolol SR (TOPROL XL) 50 MG TABLET SR 24 HR Once daily at bedtime. (Patient taking differently: 25 mg. Once daily at bedtime.) 90 Tab 3   • lisinopril (PRINIVIL) 5 MG Tab Take 5 mg by mouth every day.     • albuterol (PROAIR HFA) 108 (90 BASE) MCG/ACT Aero Soln inhalation aerosol Inhale 2 Puffs by mouth every four hours as needed for Shortness of Breath (wheezing). 1 Inhaler 1   • CALCIUM PO Take  by mouth.     • MAGNESIUM PO Take  by mouth.     • alprazolam (XANAX) 0.25 MG TABS Take 1 Tab by mouth at bedtime as needed for Sleep. 30 Tab 3   • Coenzyme Q10 (COQ10) 100 MG CAPS Take  by mouth every day.     • ascorbic acid (ASCORBIC ACID) 500 MG TABS Take 500 mg by mouth every day.     • hydrocodone-acetaminophen (NORCO) 5-325 MG Tab per tablet TK 1-2 TS PO Q 6 H PRN P  0   • clobetasol (TEMOVATE) 0.05 % Ointment      • Cyanocobalamin (VITAMIN B-12 INJ) by Injection route.     • omeprazole (PRILOSEC) 20 MG CPDR Take 20 mg by mouth as needed.       No current facility-administered medications for this visit.       Allergies: Sulfa drugs    Blood pressure 110/70, pulse 96, resp. rate 15, height 1.651 m (5' 5\"), weight 92.08 kg (203 lb), SpO2 98 %.      ROS:   Constitutional: Denies fevers, chills, night sweats, weight loss. Positive mild fatigue.  Eyes: Denies pain, discharge/drainage  ENT: Denies tinnitus, hearing loss, sinusitis, hoarseness, epistaxis  Allergic: Denies Allergic rhinitis or hayfever  Respiratory: See HPI  Cardiovascular: " Denies chest pain, tightness, palpitations, orthopnea or edema  Sleep: Denies snoring, resuscitative gasps, frequent nocturnal awakenings, insomnia  GI/: Denies heartburn, nausea, vomiting, urinary incontinence, hematuria  Musculoskeletal: Denies back pain, painful joints, sore muscles  Neurological: Denies vertigo or headaches  Skin: Denies rashes, lesions  Psychiatric: Denies depression or anxiety      Physical exam:   Constitutional: Well-nourished, well-developed, in no acute distress  Eyes: PERRLA  Neck: supple, no masses  Respiratory: no intercostal retractions or accessory muscle use   Lungs auscultation: Clear to auscultation bilaterally  Cardiovascular: Regular rate rhythm no murmurs, rubs or gallops  Musculoskeletal: Normal gait, no clubbing or cyanosis  Skin: No rashes or lesions  Neuro: No focal deficit, cranial nerves grossly intact  Psychiatric: Oriented to time, person and place.     Diagnosis:  1. Essential hypertension     2. Mild persistent asthma without complication     3. Obstructive sleep apnea         Plan:  1. Okay to start Breo 100/25 one actuation daily, rinse mouth after use. If ineffective resume Symbicort. She understands she should not use both Breo and Symbicort.  2. Continue albuterol as needed.  3. Continue CPAP nightly.  4. Clean mask and tubing weekly.  5. If fatigue worsens consider titration study.

## 2017-07-26 ENCOUNTER — OFFICE VISIT (OUTPATIENT)
Dept: CARDIOLOGY | Facility: MEDICAL CENTER | Age: 72
End: 2017-07-26
Payer: COMMERCIAL

## 2017-07-26 VITALS
HEIGHT: 65 IN | OXYGEN SATURATION: 96 % | BODY MASS INDEX: 34.16 KG/M2 | SYSTOLIC BLOOD PRESSURE: 126 MMHG | DIASTOLIC BLOOD PRESSURE: 72 MMHG | WEIGHT: 205 LBS | HEART RATE: 96 BPM

## 2017-07-26 DIAGNOSIS — I47.10 PSVT (PAROXYSMAL SUPRAVENTRICULAR TACHYCARDIA): ICD-10-CM

## 2017-07-26 DIAGNOSIS — G47.33 OBSTRUCTIVE SLEEP APNEA: ICD-10-CM

## 2017-07-26 DIAGNOSIS — R41.0 DISORIENTED: ICD-10-CM

## 2017-07-26 DIAGNOSIS — R53.82 CHRONIC FATIGUE: ICD-10-CM

## 2017-07-26 PROCEDURE — 93000 ELECTROCARDIOGRAM COMPLETE: CPT | Performed by: NURSE PRACTITIONER

## 2017-07-26 PROCEDURE — 99214 OFFICE O/P EST MOD 30 MIN: CPT | Performed by: NURSE PRACTITIONER

## 2017-07-26 ASSESSMENT — ENCOUNTER SYMPTOMS
FLANK PAIN: 0
SORE THROAT: 0
SEIZURES: 0
DOUBLE VISION: 0
NAUSEA: 0
HEARTBURN: 0
MUSCULOSKELETAL NEGATIVE: 1
WHEEZING: 0
DIZZINESS: 0
PALPITATIONS: 0
BLURRED VISION: 0
FEVER: 0
SHORTNESS OF BREATH: 0
FOCAL WEAKNESS: 0
WEIGHT LOSS: 0
CLAUDICATION: 0
ORTHOPNEA: 0
VOMITING: 0
SPEECH CHANGE: 0
COUGH: 0
PSYCHIATRIC NEGATIVE: 1
HEADACHES: 0
CHILLS: 0
PND: 0
LOSS OF CONSCIOUSNESS: 0
SPUTUM PRODUCTION: 0
ABDOMINAL PAIN: 0

## 2017-07-26 NOTE — PROGRESS NOTES
"Subjective:   Keli Porter is a 72 y.o. female who presents today for review of her arrhythmias and hypertensive control.  She states this AM at around 0930 hours shortly after getting up she had an episode of altered mental status change. She comments for a few minutes she wasn't sure where she was. Her  recognized that she was not feeling well but she comments I could not respond to his questions. \" I felt like I was in an alternate planet.\" She did not have weakness of extremities. She did not loose vision. She comments her left eye is her weaker eye and it did seem to have less vision. When she sat down she felt so weak that she stated I felt like I could black out. She did not loose consciousness and after the episode she took some ASA. She felt tired and had a headache. She has been feeling fatigue over the past few months.   When asked why she didn't report to ER She became tearful and stated she had recently waited 9 hours with her  in the ER and will not do that again.  She has had the following testing in the past:  She is followed by [facility] for her PAH and recent testing demonstrated a FEV1 at 80% other indices were normal her DLCO was 111%.  She is overweight at least 50-60# and deconditioned.  Has H/O GERD and she has noted nausea and dizziness with yoga. She is sedentary due to increased SOB , nausea and some lightheadedness. She has intermittent complaints of weakness and erratic heart rhythm.  EKG in the past demonstrated isolated APC's. Sinus here today.  In 2014 she underwent right and left heart cath. Her PAP were mild as compared to moderate 50mmHg by echo.  She had normal coronary arteries.  She still with activity gets some sharp chest pressure.  I had a holter completed in the past which demonstrated APC's 1 4 beat run at 185 bpm and other 6 beat run at 135 bpm. Multiple APC's through out tracing.     6/22/16 Carotid Ultrasound  FINDINGS   Right carotid - Very mild plaque of " the carotid bifurcation. Doppler    velocities are normal.      Left carotid - Very mild plaque of the carotid bifurcation. Doppler    velocities are normal.     Bilateral subclavian and vertebral artery waveforms are antegrade and    waveforms are normal in character and velocity.     6/22/16    Transthoracic  Echo Report      Echocardiography Laboratory    CONCLUSIONS  Normal left ventricular systolic function.  Left ventricular ejection fraction is visually estimated to be 65%.  Grade I diastolic dysfunction.  The right ventricle was normal in size and function.  No significant valve disease or flow abnormalities.   No prior study is available for comparison.   FINDINGS   Right carotid - Very mild plaque of the carotid bifurcation. Doppler    velocities are normal.      Left carotid - Very mild plaque of the carotid bifurcation. Doppler    velocities are normal.     Bilateral subclavian and vertebral artery waveforms are antegrade and    waveforms are normal in character and velocity.         Past Medical History   Diagnosis Date   • HTN (hypertension)    • GERD (gastroesophageal reflux disease)    • Pulmonary HTN (CMS-HCC)    • Heart burn    • Indigestion    • Hiatus hernia syndrome    • Asthma    • Breath shortness    • Snoring    • Anesthesia      jaw dislocation after bite block   • Arrhythmia      palpitations   • Sleep apnea      uses cpap   • CHD (coronary heart disease)    • Obesity    • Hiatal hernia      Past Surgical History   Procedure Laterality Date   • Rula by laparoscopy     • Endoscopy     • Colonoscopy     • Recovery  10/15/2014     Performed by Cath-Recovery Surgery at SURGERY SAME DAY [facility] ORS   • Lumpectomy     • Tonsillectomy       Family History   Problem Relation Age of Onset   • Stroke Mother      History   Smoking status   • Never Smoker    Smokeless tobacco   • Never Used     Allergies   Allergen Reactions   • Sulfa Drugs Itching, Swelling and Unspecified     Pt. States face  redness, itching, swelling and fever     Outpatient Encounter Prescriptions as of 7/26/2017   Medication Sig Dispense Refill   • cyanocobalamin (VITAMIN B-12) 1000 MCG/ML Solution INHECT 1 ML INTRAMUSCULARLY Q 2 WEEKS  0   • budesonide-formoterol (SYMBICORT) 80-4.5 MCG/ACT Aerosol Inhale 2 Puffs by mouth 2 Times a Day. 3 Inhaler 3   • clobetasol (TEMOVATE) 0.05 % Ointment      • metoprolol SR (TOPROL XL) 50 MG TABLET SR 24 HR Once daily at bedtime. (Patient taking differently: 25 mg. Once daily at bedtime.) 90 Tab 3   • lisinopril (PRINIVIL) 5 MG Tab Take 5 mg by mouth every day.     • albuterol (PROAIR HFA) 108 (90 BASE) MCG/ACT Aero Soln inhalation aerosol Inhale 2 Puffs by mouth every four hours as needed for Shortness of Breath (wheezing). 1 Inhaler 1   • CALCIUM PO Take  by mouth.     • MAGNESIUM PO Take  by mouth.     • alprazolam (XANAX) 0.25 MG TABS Take 1 Tab by mouth at bedtime as needed for Sleep. 30 Tab 3   • omeprazole (PRILOSEC) 20 MG CPDR Take 20 mg by mouth as needed.     • Coenzyme Q10 (COQ10) 100 MG CAPS Take  by mouth every day.     • ascorbic acid (ASCORBIC ACID) 500 MG TABS Take 500 mg by mouth every day.     • hydrocodone-acetaminophen (NORCO) 5-325 MG Tab per tablet TK 1-2 TS PO Q 6 H PRN P  0   • naproxen (NAPROSYN) 500 MG Tab TK 1 T PO BID WITH FOOD  1   • Cyanocobalamin (VITAMIN B-12 INJ) by Injection route.       No facility-administered encounter medications on file as of 7/26/2017.     Review of Systems   Constitutional: Negative for fever, chills, weight loss and malaise/fatigue.   HENT: Negative for congestion and sore throat.    Eyes: Negative for blurred vision and double vision.   Respiratory: Negative for cough, sputum production, shortness of breath and wheezing.    Cardiovascular: Negative for chest pain, palpitations, orthopnea, claudication, leg swelling and PND.   Gastrointestinal: Negative for heartburn, nausea, vomiting and abdominal pain.   Genitourinary: Negative for  "dysuria, frequency and flank pain.   Musculoskeletal: Negative.    Skin: Negative.    Neurological: Negative for dizziness, speech change, focal weakness, seizures, loss of consciousness and headaches.   Endo/Heme/Allergies: Negative.    Psychiatric/Behavioral: Negative.         Objective:   /72 mmHg  Pulse 96  Ht 1.651 m (5' 5\")  Wt 92.987 kg (205 lb)  BMI 34.11 kg/m2  SpO2 96%    Physical Exam   Constitutional: She is oriented to person, place, and time. She appears well-developed and well-nourished.   HENT:   Head: Normocephalic and atraumatic.   Eyes: Pupils are equal, round, and reactive to light.   Neck: Normal range of motion. Neck supple. No thyromegaly present.   Cardiovascular: Normal rate, regular rhythm, normal heart sounds and intact distal pulses.  Exam reveals no gallop and no friction rub.    No murmur heard.  Pulmonary/Chest: Effort normal and breath sounds normal. No respiratory distress. She has no wheezes. She has no rales. She exhibits no tenderness.   Abdominal: Soft. Bowel sounds are normal. She exhibits no distension. There is no tenderness. There is no guarding.   Musculoskeletal: Normal range of motion. She exhibits no edema.   Neurological: She is alert and oriented to person, place, and time.   Skin: Skin is warm and dry.   Psychiatric: She has a normal mood and affect.       Assessment:     1. PSVT (paroxysmal supraventricular tachycardia) (CMS-[facility])  EKG    MR-BRAIN-WITH & W/O    COMP METABOLIC PANEL    CBC WITH DIFFERENTIAL    HOLTER MONITOR / EVENT RECORDER   2. Disoriented  MR-BRAIN-WITH & W/O    COMP METABOLIC PANEL    CBC WITH DIFFERENTIAL    HOLTER MONITOR / EVENT RECORDER    REFERRAL TO NEUROLOGY   3. Chronic fatigue     4. Obstructive sleep apnea         Medical Decision Making:  Today's Assessment / Status / Plan:     1. PSVT episode of feeling confused and near syncopal will obtain holter monitor Zio or 21 day.  2. Episode of disorientation 45 minutes refused ER. " Obtain MRI with and without to rule out CVA/TIA.  3. Chronic fatigue received B12 shot yesterday.  4. CHELY followed by PMA as noted seen yesterday.  RTC in [facility] to see Dr Ya after monitor completed. I will call her with MR results.  I have asked her to see PCP ASAP for review of above MRI, labs etc.    Collaborating MD Lee

## 2017-07-26 NOTE — Clinical Note
"     [facility]   [address], [address]  [place] [address]  Phone: [phone]  Fax: [phone]              Keli Porter  1945    Encounter Date: 7/26/2017    OCTAVIO Segovia          PROGRESS NOTE:  Subjective:   Keli Porter is a 72 y.o. female who presents today for review of her arrhythmias and hypertensive control.  She states this AM at around 0930 hours shortly after getting up she had an episode of altered mental status change. She comments for a few minutes she wasn't sure where she was. Her  recognized that she was not feeling well but she comments I could not respond to his questions. \" I felt like I was in an alternate planet.\" She did not have weakness of extremities. She did not loose vision. She comments her left eye is her weaker eye and it did seem to have less vision. When she sat down she felt so weak that she stated I felt like I could black out. She did not loose consciousness and after the episode she took some ASA. She felt tired and had a headache. She has been feeling fatigue over the past few months.   When asked why she didn't report to ER She became tearful and stated she had recently waited 9 hours with her  in the ER and will not do that again.  She has had the following testing in the past:  She is followed by [facility] for her PAH and recent testing demonstrated a FEV1 at 80% other indices were normal her DLCO was 111%.  She is overweight at least 50-60# and deconditioned.  Has H/O GERD and she has noted nausea and dizziness with yoga. She is sedentary due to increased SOB , nausea and some lightheadedness. She has intermittent complaints of weakness and erratic heart rhythm.  EKG in the past demonstrated isolated APC's. Sinus here today.  In 2014 she underwent right and left heart cath. Her PAP were mild as compared to moderate 50mmHg by echo.  She had normal coronary arteries.  She still with activity " gets some sharp chest pressure.  I had a holter completed in the past which demonstrated APC's 1 4 beat run at 185 bpm and other 6 beat run at 135 bpm. Multiple APC's through out tracing.     6/22/16 Carotid Ultrasound  FINDINGS   Right carotid - Very mild plaque of the carotid bifurcation. Doppler    velocities are normal.      Left carotid - Very mild plaque of the carotid bifurcation. Doppler    velocities are normal.     Bilateral subclavian and vertebral artery waveforms are antegrade and    waveforms are normal in character and velocity.     6/22/16    Transthoracic  Echo Report      Echocardiography Laboratory    CONCLUSIONS  Normal left ventricular systolic function.  Left ventricular ejection fraction is visually estimated to be 65%.  Grade I diastolic dysfunction.  The right ventricle was normal in size and function.  No significant valve disease or flow abnormalities.   No prior study is available for comparison.   FINDINGS   Right carotid - Very mild plaque of the carotid bifurcation. Doppler    velocities are normal.      Left carotid - Very mild plaque of the carotid bifurcation. Doppler    velocities are normal.     Bilateral subclavian and vertebral artery waveforms are antegrade and    waveforms are normal in character and velocity.         Past Medical History   Diagnosis Date   • HTN (hypertension)    • GERD (gastroesophageal reflux disease)    • Pulmonary HTN (CMS-HCC)    • Heart burn    • Indigestion    • Hiatus hernia syndrome    • Asthma    • Breath shortness    • Snoring    • Anesthesia      jaw dislocation after bite block   • Arrhythmia      palpitations   • Sleep apnea      uses cpap   • CHD (coronary heart disease)    • Obesity    • Hiatal hernia      Past Surgical History   Procedure Laterality Date   • Rula by laparoscopy     • Endoscopy     • Colonoscopy     • Recovery  10/15/2014     Performed by Cath-Recovery Surgery at SURGERY SAME DAY [facility] ORS   • Lumpectomy     •  Tonsillectomy       Family History   Problem Relation Age of Onset   • Stroke Mother      History   Smoking status   • Never Smoker    Smokeless tobacco   • Never Used     Allergies   Allergen Reactions   • Sulfa Drugs Itching, Swelling and Unspecified     Pt. States face redness, itching, swelling and fever     Outpatient Encounter Prescriptions as of 7/26/2017   Medication Sig Dispense Refill   • cyanocobalamin (VITAMIN B-12) 1000 MCG/ML Solution INHECT 1 ML INTRAMUSCULARLY Q 2 WEEKS  0   • budesonide-formoterol (SYMBICORT) 80-4.5 MCG/ACT Aerosol Inhale 2 Puffs by mouth 2 Times a Day. 3 Inhaler 3   • clobetasol (TEMOVATE) 0.05 % Ointment      • metoprolol SR (TOPROL XL) 50 MG TABLET SR 24 HR Once daily at bedtime. (Patient taking differently: 25 mg. Once daily at bedtime.) 90 Tab 3   • lisinopril (PRINIVIL) 5 MG Tab Take 5 mg by mouth every day.     • albuterol (PROAIR HFA) 108 (90 BASE) MCG/ACT Aero Soln inhalation aerosol Inhale 2 Puffs by mouth every four hours as needed for Shortness of Breath (wheezing). 1 Inhaler 1   • CALCIUM PO Take  by mouth.     • MAGNESIUM PO Take  by mouth.     • alprazolam (XANAX) 0.25 MG TABS Take 1 Tab by mouth at bedtime as needed for Sleep. 30 Tab 3   • omeprazole (PRILOSEC) 20 MG CPDR Take 20 mg by mouth as needed.     • Coenzyme Q10 (COQ10) 100 MG CAPS Take  by mouth every day.     • ascorbic acid (ASCORBIC ACID) 500 MG TABS Take 500 mg by mouth every day.     • hydrocodone-acetaminophen (NORCO) 5-325 MG Tab per tablet TK 1-2 TS PO Q 6 H PRN P  0   • naproxen (NAPROSYN) 500 MG Tab TK 1 T PO BID WITH FOOD  1   • Cyanocobalamin (VITAMIN B-12 INJ) by Injection route.       No facility-administered encounter medications on file as of 7/26/2017.     Review of Systems   Constitutional: Negative for fever, chills, weight loss and malaise/fatigue.   HENT: Negative for congestion and sore throat.    Eyes: Negative for blurred vision and double vision.   Respiratory: Negative for cough,  "sputum production, shortness of breath and wheezing.    Cardiovascular: Negative for chest pain, palpitations, orthopnea, claudication, leg swelling and PND.   Gastrointestinal: Negative for heartburn, nausea, vomiting and abdominal pain.   Genitourinary: Negative for dysuria, frequency and flank pain.   Musculoskeletal: Negative.    Skin: Negative.    Neurological: Negative for dizziness, speech change, focal weakness, seizures, loss of consciousness and headaches.   Endo/Heme/Allergies: Negative.    Psychiatric/Behavioral: Negative.         Objective:   /72 mmHg  Pulse 96  Ht 1.651 m (5' 5\")  Wt 92.987 kg (205 lb)  BMI 34.11 kg/m2  SpO2 96%    Physical Exam   Constitutional: She is oriented to person, place, and time. She appears well-developed and well-nourished.   HENT:   Head: Normocephalic and atraumatic.   Eyes: Pupils are equal, round, and reactive to light.   Neck: Normal range of motion. Neck supple. No thyromegaly present.   Cardiovascular: Normal rate, regular rhythm, normal heart sounds and intact distal pulses.  Exam reveals no gallop and no friction rub.    No murmur heard.  Pulmonary/Chest: Effort normal and breath sounds normal. No respiratory distress. She has no wheezes. She has no rales. She exhibits no tenderness.   Abdominal: Soft. Bowel sounds are normal. She exhibits no distension. There is no tenderness. There is no guarding.   Musculoskeletal: Normal range of motion. She exhibits no edema.   Neurological: She is alert and oriented to person, place, and time.   Skin: Skin is warm and dry.   Psychiatric: She has a normal mood and affect.       Assessment:     1. PSVT (paroxysmal supraventricular tachycardia) (CMS-[facility])  EKG    MR-BRAIN-WITH & W/O    COMP METABOLIC PANEL    CBC WITH DIFFERENTIAL    HOLTER MONITOR / EVENT RECORDER   2. Disoriented  MR-BRAIN-WITH & W/O    COMP METABOLIC PANEL    CBC WITH DIFFERENTIAL    HOLTER MONITOR / EVENT RECORDER    REFERRAL TO NEUROLOGY   3. " Chronic fatigue     4. Obstructive sleep apnea         Medical Decision Making:  Today's Assessment / Status / Plan:     1. PSVT episode of feeling confused and near syncopal will obtain holter monitor Zio or 21 day.  2. Episode of disorientation 45 minutes refused ER. Obtain MRI with and without to rule out CVA/TIA.  3. Chronic fatigue received B12 shot yesterday.  4. CHELY followed by PMA as noted seen yesterday.  RTC in [facility] to see Dr Ya after monitor completed. I will call her with MR results.  I have asked her to see PCP ASAP for review of above MRI, labs etc.    Collaborating MD Jesus Reed M.D.  [address]  [address]  [facility] [place] [id number]  VIA Facsimile: [phone]

## 2017-07-26 NOTE — MR AVS SNAPSHOT
"        Keli Porter   2017 2:40 PM   Office Visit   MRN: [id number]    Department:  [facility]   Dept Phone:  [phone]    Description:  Female : 1945   Provider:  ROSA SegoviaPARNOL           Reason for Visit     Follow-Up           Allergies as of 2017     Allergen Noted Reactions    Sulfa Drugs 10/01/2015   Itching, Swelling, Unspecified    Pt. States face redness, itching, swelling and fever      You were diagnosed with     PSVT (paroxysmal supraventricular tachycardia) (CMS-HCC)   [225919]       Disoriented   [2284458]         Vital Signs     Blood Pressure Pulse Height Weight Body Mass Index Oxygen Saturation    126/72 mmHg 96 1.651 m (5' 5\") 92.987 kg (205 lb) 34.11 kg/m2 96%    Smoking Status                   Never Smoker            Basic Information     Date Of Birth Sex Race Ethnicity Preferred Language    1945 Female White Non- English      Your appointments     Sep 25, 2017  9:00 AM   FOLLOW UP with Kathie Ya M.D.   [facility] (--)    [address], [address]  [place] [address]   [phone]            Rojas 15, 2018  9:20 AM   Established Patient Pul with KIRAN Carmona   [facility] (--)    [address]  [address]  [place] [address] [phone]              Problem List              ICD-10-CM Priority Class Noted - Resolved    HTN (hypertension) I10   Unknown - Present    GERD (gastroesophageal reflux disease) K21.9   Unknown - Present    Other chest pain R07.89   2013 - Present    Palpitations R00.2   2013 - Present    Fatigue R53.83   2013 - Present    Coronary atherosclerosis of native coronary artery I25.10   2013 - Present    Carotid artery disease (CMS-HCC) I77.9   2013 - Present    Anxiety F41.9   2014 - Present    Asthma J45.909   2016 - Present    Obstructive sleep apnea G47.33   2016 - Present    Paroxysmal supraventricular " tachycardia (CMS-HCC) I47.1   12/6/2016 - Present      Health Maintenance        Date Due Completion Dates    IMM DTaP/Tdap/Td Vaccine (1 - Tdap) 4/26/1963 ---    PAP SMEAR 4/26/1965 ---    MAMMOGRAM 4/26/1984 ---    COLONOSCOPY 4/26/1994 ---    IMM ZOSTER VACCINE 4/26/2004 ---    BONE DENSITY 4/26/2009 ---    IMM PNEUMOCOCCAL 65+ (ADULT) LOW/MEDIUM RISK SERIES (1 of 2 - PCV13) 4/26/2009 ---    IMM INFLUENZA (1) 9/1/2017 10/28/2016            Results       Current Immunizations     Influenza TIV (IM) 10/28/2016      Below and/or attached are the medications your provider expects you to take. Review all of your home medications and newly ordered medications with your provider and/or pharmacist. Follow medication instructions as directed by your provider and/or pharmacist. Please keep your medication list with you and share with your provider. Update the information when medications are discontinued, doses are changed, or new medications (including over-the-counter products) are added; and carry medication information at all times in the event of emergency situations     Allergies:  SULFA DRUGS - Itching,Swelling,Unspecified               Medications  Valid as of: July 26, 2017 -  3:12 PM    Generic Name Brand Name Tablet Size Instructions for use    Albuterol Sulfate (Aero Soln) albuterol 108 (90 BASE) MCG/ACT Inhale 2 Puffs by mouth every four hours as needed for Shortness of Breath (wheezing).        ALPRAZolam (Tab) XANAX 0.25 MG Take 1 Tab by mouth at bedtime as needed for Sleep.        Ascorbic Acid (Tab) ascorbic acid 500 MG Take 500 mg by mouth every day.        Budesonide-Formoterol Fumarate (Aerosol) SYMBICORT 80-4.5 MCG/ACT Inhale 2 Puffs by mouth 2 Times a Day.        Calcium   Take  by mouth.        Clobetasol Propionate (Ointment) TEMOVATE 0.05 %         Coenzyme Q10 (Cap) Coenzyme Q10 100 MG Take  by mouth every day.        Cyanocobalamin   by Injection route.        Cyanocobalamin (Solution) VITAMIN  B-12 1000 MCG/ML INHECT 1 ML INTRAMUSCULARLY Q 2 WEEKS        Hydrocodone-Acetaminophen (Tab) NORCO 5-325 MG TK 1-2 TS PO Q 6 H PRN P        Lisinopril (Tab) PRINIVIL 5 MG Take 5 mg by mouth every day.        Magnesium   Take  by mouth.        Metoprolol Succinate (TABLET SR 24 HR) TOPROL XL 50 MG Once daily at bedtime.        Naproxen (Tab) NAPROSYN 500 MG TK 1 T PO BID WITH FOOD        Omeprazole (CAPSULE DELAYED RELEASE) PRILOSEC 20 MG Take 20 mg by mouth as needed.        .                 Medicines prescribed today were sent to:     [facility] - [place], [place] - [address]    [address] [address]    Phone: [phone] Fax: [phone]    Open 24 Hours?: No    [facility] [address] [place] [address] [place], [place] - [address] [facility]    [address] [place] [address]    Phone: [phone] Fax: [phone]    Open 24 Hours?: No      Medication refill instructions:       If your prescription bottle indicates you have medication refills left, it is not necessary to call your provider’s office. Please contact your pharmacy and they will refill your medication.    If your prescription bottle indicates you do not have any refills left, you may request refills at any time through one of the following ways: The online [email or web address] system (except Urgent Care), by calling your provider’s office, or by asking your pharmacy to contact your provider’s office with a refill request. Medication refills are processed only during regular business hours and may not be available until the next business day. Your provider may request additional information or to have a follow-up visit with you prior to refilling your medication.   *Please Note: Medication refills are assigned a new Rx number when refilled electronically. Your pharmacy may indicate that no refills were authorized even though a new prescription for the same medication is available at the pharmacy. Please  request the medicine by name with the pharmacy before contacting your provider for a refill.        Your To Do List     Future Labs/Procedures Complete By Expires    CBC WITH DIFFERENTIAL  As directed 7/26/2018    COMP METABOLIC PANEL  As directed 7/26/2018    HOLTER MONITOR / EVENT RECORDER  As directed 7/26/2018    Scheduling Instructions:    ASAP if cant have Zio 21 day monitor    MR-BRAIN-WITH & W/O  As directed 7/26/2018      Referral     A referral request has been sent to our patient care coordination department. Please allow 3-5 business days for us to process this request and contact you either by phone or mail. If you do not hear from us by the 5th business day, please call us at [phone].        Other Notes About Your Plan     [facility] [phone] Fax. [phone]           MyChart Access Code: Activation code not generated  Current [employer] Status: Active

## 2017-07-27 LAB
ALBUMIN SERPL-MCNC: 4.3 G/DL (ref 3.5–4.8)
ALBUMIN/GLOB SERPL: 2.3 {RATIO} (ref 1.2–2.2)
ALP SERPL-CCNC: 91 IU/L (ref 39–117)
ALT SERPL-CCNC: 19 IU/L (ref 0–32)
AST SERPL-CCNC: 16 IU/L (ref 0–40)
BASOPHILS # BLD AUTO: 0 X10E3/UL (ref 0–0.2)
BASOPHILS NFR BLD AUTO: 0 %
BILIRUB SERPL-MCNC: 0.6 MG/DL (ref 0–1.2)
BUN SERPL-MCNC: 17 MG/DL (ref 8–27)
BUN/CREAT SERPL: 21 (ref 12–28)
CALCIUM SERPL-MCNC: 9.7 MG/DL (ref 8.7–10.3)
CHLORIDE SERPL-SCNC: 105 MMOL/L (ref 96–106)
CO2 SERPL-SCNC: 23 MMOL/L (ref 18–29)
CREAT SERPL-MCNC: 0.81 MG/DL (ref 0.57–1)
EOSINOPHIL # BLD AUTO: 0.5 X10E3/UL (ref 0–0.4)
EOSINOPHIL NFR BLD AUTO: 5 %
ERYTHROCYTE [DISTWIDTH] IN BLOOD BY AUTOMATED COUNT: 14.5 % (ref 12.3–15.4)
GLOBULIN SER CALC-MCNC: 1.9 G/DL (ref 1.5–4.5)
GLUCOSE SERPL-MCNC: 96 MG/DL (ref 65–99)
HCT VFR BLD AUTO: 44.1 % (ref 34–46.6)
HGB BLD-MCNC: 14.6 G/DL (ref 11.1–15.9)
IMM GRANULOCYTES # BLD: ABNORMAL 10*3/UL
IMM GRANULOCYTES NFR BLD: ABNORMAL %
IMMATURE CELLS  115398: ABNORMAL
LYMPHOCYTES # BLD AUTO: 2.5 X10E3/UL (ref 0.7–3.1)
LYMPHOCYTES NFR BLD AUTO: 24 %
MCH RBC QN AUTO: 30.7 PG (ref 26.6–33)
MCHC RBC AUTO-ENTMCNC: 33.1 G/DL (ref 31.5–35.7)
MCV RBC AUTO: 93 FL (ref 79–97)
MONOCYTES # BLD AUTO: 0.6 X10E3/UL (ref 0.1–0.9)
MONOCYTES NFR BLD AUTO: 6 %
MORPHOLOGY BLD-IMP: ABNORMAL
NEUTROPHILS # BLD AUTO: 6.8 X10E3/UL (ref 1.4–7)
NEUTROPHILS NFR BLD AUTO: 65 %
NRBC BLD AUTO-RTO: ABNORMAL %
PLATELET # BLD AUTO: 238 X10E3/UL (ref 150–379)
POTASSIUM SERPL-SCNC: 4.7 MMOL/L (ref 3.5–5.2)
PROT SERPL-MCNC: 6.2 G/DL (ref 6–8.5)
RBC # BLD AUTO: 4.75 X10E6/UL (ref 3.77–5.28)
SODIUM SERPL-SCNC: 139 MMOL/L (ref 134–144)
WBC # BLD AUTO: 10.4 X10E3/UL (ref 3.4–10.8)

## 2017-07-28 ENCOUNTER — HOSPITAL ENCOUNTER (OUTPATIENT)
Dept: RADIOLOGY | Facility: MEDICAL CENTER | Age: 72
End: 2017-07-28
Attending: NURSE PRACTITIONER
Payer: COMMERCIAL

## 2017-07-28 ENCOUNTER — TELEPHONE (OUTPATIENT)
Dept: CARDIOLOGY | Facility: MEDICAL CENTER | Age: 72
End: 2017-07-28

## 2017-07-28 DIAGNOSIS — I47.10 PSVT (PAROXYSMAL SUPRAVENTRICULAR TACHYCARDIA): ICD-10-CM

## 2017-07-28 DIAGNOSIS — R41.0 DISORIENTED: ICD-10-CM

## 2017-07-28 PROCEDURE — A9579 GAD-BASE MR CONTRAST NOS,1ML: HCPCS | Performed by: NURSE PRACTITIONER

## 2017-07-28 PROCEDURE — 70553 MRI BRAIN STEM W/O & W/DYE: CPT

## 2017-07-28 PROCEDURE — 700117 HCHG RX CONTRAST REV CODE 255: Performed by: NURSE PRACTITIONER

## 2017-07-28 RX ADMIN — GADODIAMIDE 20 ML: 287 INJECTION INTRAVENOUS at 15:59

## 2017-07-28 NOTE — TELEPHONE ENCOUNTER
Per Melonie Faye: MRI brain is normal.  Pt. Still needs to see neurologist, follow-up with PCP,  and get event recorder.  Left message for pt. To call.

## 2017-07-31 NOTE — TELEPHONE ENCOUNTER
Pt. Advised; contact number given.         Message  Received: Today       Abraham Myles L.P.NGary       Caller: Unspecified (3 days ago, 4:28 PM)                     [facility]

## 2017-07-31 NOTE — TELEPHONE ENCOUNTER
Spoke with pt. To give results, recommendations.  Perry Rosario - to which neurology group was referral sent?

## 2017-08-01 LAB — EKG IMPRESSION: NORMAL

## 2017-08-07 ENCOUNTER — TELEPHONE (OUTPATIENT)
Dept: CARDIOLOGY | Facility: MEDICAL CENTER | Age: 72
End: 2017-08-07

## 2017-08-07 ENCOUNTER — NON-PROVIDER VISIT (OUTPATIENT)
Dept: CARDIOLOGY | Facility: MEDICAL CENTER | Age: 72
End: 2017-08-07
Attending: NURSE PRACTITIONER
Payer: COMMERCIAL

## 2017-08-07 DIAGNOSIS — R41.0 DISORIENTED: ICD-10-CM

## 2017-08-07 DIAGNOSIS — I47.10 PSVT (PAROXYSMAL SUPRAVENTRICULAR TACHYCARDIA): ICD-10-CM

## 2017-08-08 ENCOUNTER — TELEPHONE (OUTPATIENT)
Dept: CARDIOLOGY | Facility: MEDICAL CENTER | Age: 72
End: 2017-08-08

## 2017-08-29 ENCOUNTER — TELEPHONE (OUTPATIENT)
Dept: CARDIOLOGY | Facility: MEDICAL CENTER | Age: 72
End: 2017-08-29

## 2017-08-29 PROCEDURE — 0296T PR EXT ECG > 48HR TO 21 DAY RCRD W/CONECT INTL RCRD: CPT | Performed by: INTERNAL MEDICINE

## 2017-08-29 PROCEDURE — 0298T PR EXT ECG > 48HR TO 21 DAY REVIEW AND INTERPRETATN: CPT | Performed by: INTERNAL MEDICINE

## 2017-08-29 NOTE — TELEPHONE ENCOUNTER
Melonie reviewed recent Texopatch report. Per Melonie: showed episodes of SVT, longest=17beats. Pt. Should increase Toprol to 50mg QD if she can tolerate. If she can't tolerate that dose, she should schedule NP appointment with EP for evaluation, discuss ablation.  Called pt. To advise. She will increase Toprol. She has FV 9-25-17 with AA and will discuss further at thst time.

## 2017-09-07 ENCOUNTER — OFFICE VISIT (OUTPATIENT)
Dept: CARDIOLOGY | Facility: MEDICAL CENTER | Age: 72
End: 2017-09-07
Payer: COMMERCIAL

## 2017-09-07 VITALS
WEIGHT: 202 LBS | HEIGHT: 65 IN | BODY MASS INDEX: 33.66 KG/M2 | SYSTOLIC BLOOD PRESSURE: 122 MMHG | HEART RATE: 96 BPM | OXYGEN SATURATION: 96 % | DIASTOLIC BLOOD PRESSURE: 70 MMHG

## 2017-09-07 DIAGNOSIS — I25.10 ATHEROSCLEROSIS OF NATIVE CORONARY ARTERY OF NATIVE HEART WITHOUT ANGINA PECTORIS: ICD-10-CM

## 2017-09-07 PROCEDURE — 99214 OFFICE O/P EST MOD 30 MIN: CPT | Performed by: INTERNAL MEDICINE

## 2017-09-07 RX ORDER — INFLUENZA A VIRUS A/MICHIGAN/45/2015 X-275 (H1N1) ANTIGEN (FORMALDEHYDE INACTIVATED), INFLUENZA A VIRUS A/SINGAPORE/INFIMH-16-0019/2016 IVR-186 (H3N2) ANTIGEN (FORMALDEHYDE INACTIVATED), AND INFLUENZA B VIRUS B/MARYLAND/15/2016 BX-69A (A B/COLORADO/6/2017-LIKE VIRUS) ANTIGEN (FORMALDEHYDE INACTIVATED) 60; 60; 60 UG/.5ML; UG/.5ML; UG/.5ML
INJECTION, SUSPENSION INTRAMUSCULAR
Refills: 0 | COMMUNITY
Start: 2017-08-29 | End: 2017-09-07

## 2017-09-07 RX ORDER — AZITHROMYCIN 250 MG/1
TABLET, FILM COATED ORAL
Refills: 0 | COMMUNITY
Start: 2017-08-10

## 2017-09-07 RX ORDER — METHYLPREDNISOLONE 4 MG/1
TABLET ORAL
Refills: 0 | COMMUNITY
Start: 2017-08-10 | End: 2017-09-07

## 2017-09-07 ASSESSMENT — ENCOUNTER SYMPTOMS
ORTHOPNEA: 0
DEPRESSION: 0
DIZZINESS: 0
PALPITATIONS: 1
LOSS OF CONSCIOUSNESS: 0
FALLS: 0
ABDOMINAL PAIN: 0
SHORTNESS OF BREATH: 0
BRUISES/BLEEDS EASILY: 0
PND: 0

## 2017-09-07 NOTE — PROGRESS NOTES
"Subjective:   Keli Porter is a 72 y.o. femaleWith past medical history significant for hypertension, obstructive sleep apnea on CPAP and history of SVT who was previously seen by Yunier DICK and now scheduled to establish care in my clinic. Patient was last seen in July. At that time she had reported an episode of feeling \"spaced out\". Given concern for arrhythmias, she was referred for a Ziopatch, detailed below. She was recommended to increase her metoprolol from 25 to 50 every day but patient did not increase her dose as she was concerned about having worsening fatigue.    Since that episode, she has not had any recurrent episodes of \"spacing out\". She does however continue to have palpitations about once a week without any associated symptoms. Symptoms usually start at rest.    She drinks at least 3 cups of coffee if not more every day.     She exercises on a regular basis walking on a treadmill without any symptoms. She has been losing 1 pound every month. She has been watching her diet as well.    Past Medical History:   Diagnosis Date   • Anesthesia     jaw dislocation after bite block   • Arrhythmia     palpitations   • Asthma    • Breath shortness    • CHD (coronary heart disease)    • GERD (gastroesophageal reflux disease)    • Heart burn    • Hiatal hernia    • Hiatus hernia syndrome    • HTN (hypertension)    • Indigestion    • Obesity    • Pulmonary HTN (CMS-HCC)    • Sleep apnea     uses cpap   • Snoring      Past Surgical History:   Procedure Laterality Date   • RECOVERY  10/15/2014    Performed by Cath-Recovery Surgery at SURGERY SAME DAY [facility] ORS   • KRISTEN BY LAPAROSCOPY     • COLONOSCOPY     • ENDOSCOPY     • LUMPECTOMY     • TONSILLECTOMY       Family History   Problem Relation Age of Onset   • Stroke Mother      History   Smoking Status   • Never Smoker   Smokeless Tobacco   • Never Used     No alcohol or recreational drug use.    Allergies   Allergen Reactions   • Sulfa Drugs Itching, " Swelling and Unspecified     Pt. States face redness, itching, swelling and fever     Outpatient Encounter Prescriptions as of 9/7/2017   Medication Sig Dispense Refill   • azithromycin (ZITHROMAX) 250 MG Tab   0   • Omega-3 Fatty Acids (GNP FISH OIL PO) Take  by mouth.     • cyanocobalamin (VITAMIN B-12) 1000 MCG/ML Solution INHECT 1 ML INTRAMUSCULARLY Q 2 WEEKS  0   • budesonide-formoterol (SYMBICORT) 80-4.5 MCG/ACT Aerosol Inhale 2 Puffs by mouth 2 Times a Day. 3 Inhaler 3   • clobetasol (TEMOVATE) 0.05 % Ointment      • metoprolol SR (TOPROL XL) 50 MG TABLET SR 24 HR Once daily at bedtime. (Patient taking differently: 25 mg. Once daily at bedtime.) 90 Tab 3   • Cyanocobalamin (VITAMIN B-12 INJ) by Injection route.     • lisinopril (PRINIVIL) 5 MG Tab Take 5 mg by mouth every day.     • CALCIUM PO Take  by mouth.     • MAGNESIUM PO Take  by mouth.     • omeprazole (PRILOSEC) 20 MG CPDR Take 20 mg by mouth as needed.     • Coenzyme Q10 (COQ10) 100 MG CAPS Take  by mouth every day.     • ascorbic acid (ASCORBIC ACID) 500 MG TABS Take 500 mg by mouth every day.     • [DISCONTINUED] FLUZONE HIGH-DOSE 0.5 ML Suspension Prefilled Syringe injection ADM 0.5ML IM UTD  0   • [DISCONTINUED] MethylPREDNISolone (MEDROL DOSEPAK) 4 MG Tablet Therapy Pack TK UTD  0   • [DISCONTINUED] hydrocodone-acetaminophen (NORCO) 5-325 MG Tab per tablet TK 1-2 TS PO Q 6 H PRN P  0   • [DISCONTINUED] naproxen (NAPROSYN) 500 MG Tab TK 1 T PO BID WITH FOOD  1   • albuterol (PROAIR HFA) 108 (90 BASE) MCG/ACT Aero Soln inhalation aerosol Inhale 2 Puffs by mouth every four hours as needed for Shortness of Breath (wheezing). 1 Inhaler 1   • alprazolam (XANAX) 0.25 MG TABS Take 1 Tab by mouth at bedtime as needed for Sleep. 30 Tab 3     No facility-administered encounter medications on file as of 9/7/2017.      Review of Systems   Constitutional: Negative for malaise/fatigue.   HENT: Negative.    Respiratory: Negative for shortness of breath.   "  Cardiovascular: Positive for palpitations. Negative for chest pain, orthopnea, leg swelling and PND.   Gastrointestinal: Negative for abdominal pain.   Musculoskeletal: Negative for falls.   Skin: Negative.    Neurological: Negative for dizziness and loss of consciousness.   Endo/Heme/Allergies: Does not bruise/bleed easily.   Psychiatric/Behavioral: Negative for depression.   All other systems reviewed and are negative.       Objective:   /70   Pulse 96   Ht 1.651 m (5' 5\")   Wt 91.6 kg (202 lb)   SpO2 96%   BMI 33.61 kg/m²     Physical Exam   Constitutional: She is oriented to person, place, and time. No distress.   HENT:   Head: Normocephalic and atraumatic.   Eyes: Conjunctivae are normal.   Neck: Normal range of motion. Neck supple. No JVD present.   Cardiovascular: Normal rate, regular rhythm and normal heart sounds.  Exam reveals no gallop and no friction rub.    No murmur heard.  Pulmonary/Chest: Effort normal and breath sounds normal. No respiratory distress. She has no wheezes. She has no rales.   Abdominal: Soft. There is no tenderness.   Musculoskeletal: She exhibits no edema.   Neurological: She is alert and oriented to person, place, and time.   Skin: Skin is warm and dry. She is not diaphoretic.   Psychiatric: She has a normal mood and affect.   Nursing note and vitals reviewed.    Cardiac catheterization performed in October 2014.   FINDINGS:  HEMODYNAMICS:  RIGHT HEART PRESSURES:  Mean right atrial pressure 1.  Right ventricular pressure 36/5.  Pulmonary artery pressure 33/12, mean of 22.  Mean wedge pressure 14.  Thermodilution cardiac output 6.8 L per minute, cardiac index 3.4 L per minute   per m2.  Yissel cardiac output 6.7 L per minute, cardiac index 3.3 L per minute per m2.  LEFT HEART PRESSURES:  LVEDP of 12, left ventricular systolic pressure 146, central aortic pressure   systolic 150, diastolic 75, mean of 111.  Pulmonary vascular resistance of 0.9 Wood units.  CONCLUSION:  1.  " Mild pulmonary hypertension, no evidence of pulmonary arterial   hypertension.  2.  Ejection fraction 71%.  Normal wall motion.  3.  Normal coronary arteries.    Labs from July 2017 was reviewed. Hemoglobin normal. Creatinine normal.    Ziopatch from August 2017 was reviewed.  Normal sinus rhythm with an average heart rate of 83 bpm. 90 runs of SVT were noted. Fastest was 7 beats with a maximum heart rate of 187 bpm. Longest was 17 beats with an average heart rate of 148 bpm. Rare PACs and PVCs.  SVT does not correlate with symptoms.    Assessment:     1. Atherosclerosis of native coronary artery of native heart without angina pectoris         Medical Decision Making:  Today's Assessment / Status / Plan:     As noted above, patient has had palpitations every so often. Her Ziopatch showed multiple short runs of SVT. None of her symptoms correlated with SVT. She has been tolerating the 25 mg of metoprolol but is concerned about increasing her dose further. For now I have recommended the patient to cut back to one cup of coffee a day or completely quit all caffeinated beverages. She will work on her caffeine intake and then return to clinic in about one month for reevaluation. She will keep a log of her symptoms. Depending on her symptom frequency, we can consider increasing her dose to 37.5 mg daily or splitting them in to a twice a day regimen.     Her blood pressure is well controlled on the current medication regimen. No changes for now.    She has a history of obstructive sleep apnea and is compliant on her CPAP.     Return to clinic in 1 month or earlier if needed.    Thank you for allowing me to participate in the care of this patient. Please do not hesitate to contact me with any questions.    Kathie Ya MD  Cardiologist  [facility]      PLEASE NOTE: This dictation was created using voice recognition software.

## 2017-10-04 ENCOUNTER — OFFICE VISIT (OUTPATIENT)
Dept: CARDIOLOGY | Facility: MEDICAL CENTER | Age: 72
End: 2017-10-04
Payer: COMMERCIAL

## 2017-10-04 VITALS
OXYGEN SATURATION: 96 % | DIASTOLIC BLOOD PRESSURE: 78 MMHG | SYSTOLIC BLOOD PRESSURE: 124 MMHG | HEART RATE: 98 BPM | WEIGHT: 203 LBS | BODY MASS INDEX: 33.82 KG/M2 | HEIGHT: 65 IN

## 2017-10-04 DIAGNOSIS — I10 ESSENTIAL HYPERTENSION: ICD-10-CM

## 2017-10-04 DIAGNOSIS — I25.10 ATHEROSCLEROSIS OF NATIVE CORONARY ARTERY OF NATIVE HEART WITHOUT ANGINA PECTORIS: ICD-10-CM

## 2017-10-04 PROCEDURE — 99214 OFFICE O/P EST MOD 30 MIN: CPT | Performed by: INTERNAL MEDICINE

## 2017-10-04 ASSESSMENT — ENCOUNTER SYMPTOMS
BRUISES/BLEEDS EASILY: 0
ORTHOPNEA: 0
PALPITATIONS: 0
DIZZINESS: 0
FALLS: 0
ABDOMINAL PAIN: 0
DEPRESSION: 0
SHORTNESS OF BREATH: 0
LOSS OF CONSCIOUSNESS: 0
PND: 0

## 2017-10-04 NOTE — PROGRESS NOTES
Subjective:   Keli Porter is a 72 y.o. Female with past medical history significant for hypertension, obstructive sleep apnea on CPAP and history of SVT who is presenting to clinic for follow-up. Since her last visit, she has been drinking less than one cup of coffee a day. Has been avoiding dark chocolate and other caffeinated products as well. She denies any recurrent palpitations since the above-mentioned change.    No dizziness or syncopal episodes.    She continues to exercise on a regular basis either on a treadmill or walking around her neighborhood. Denies any symptoms. She has been losing 1 pound every month. She has been watching her diet as well.    Past Medical History:   Diagnosis Date   • Anesthesia     jaw dislocation after bite block   • Arrhythmia     palpitations   • Asthma    • Breath shortness    • CHD (coronary heart disease)    • GERD (gastroesophageal reflux disease)    • Heart burn    • Hiatal hernia    • Hiatus hernia syndrome    • HTN (hypertension)    • Indigestion    • Obesity    • Pulmonary HTN    • Sleep apnea     uses cpap   • Snoring      Past Surgical History:   Procedure Laterality Date   • RECOVERY  10/15/2014    Performed by Cath-Recovery Surgery at SURGERY SAME DAY [facility] ORS   • KRISTEN BY LAPAROSCOPY     • COLONOSCOPY     • ENDOSCOPY     • LUMPECTOMY     • TONSILLECTOMY       Family History   Problem Relation Age of Onset   • Stroke Mother      History   Smoking Status   • Never Smoker   Smokeless Tobacco   • Never Used     No alcohol or recreational drug use.    Allergies   Allergen Reactions   • Sulfa Drugs Itching, Swelling and Unspecified     Pt. States face redness, itching, swelling and fever     Outpatient Encounter Prescriptions as of 10/4/2017   Medication Sig Dispense Refill   • azithromycin (ZITHROMAX) 250 MG Tab   0   • Omega-3 Fatty Acids (GNP FISH OIL PO) Take  by mouth.     • cyanocobalamin (VITAMIN B-12) 1000 MCG/ML Solution INHECT 1 ML INTRAMUSCULARLY Q 2  "WEEKS  0   • budesonide-formoterol (SYMBICORT) 80-4.5 MCG/ACT Aerosol Inhale 2 Puffs by mouth 2 Times a Day. 3 Inhaler 3   • clobetasol (TEMOVATE) 0.05 % Ointment      • metoprolol SR (TOPROL XL) 50 MG TABLET SR 24 HR Once daily at bedtime. (Patient taking differently: 25 mg. Once daily at bedtime.) 90 Tab 3   • Cyanocobalamin (VITAMIN B-12 INJ) by Injection route.     • lisinopril (PRINIVIL) 5 MG Tab Take 5 mg by mouth every day.     • CALCIUM PO Take  by mouth.     • MAGNESIUM PO Take  by mouth.     • omeprazole (PRILOSEC) 20 MG CPDR Take 20 mg by mouth as needed.     • Coenzyme Q10 (COQ10) 100 MG CAPS Take  by mouth every day.     • ascorbic acid (ASCORBIC ACID) 500 MG TABS Take 500 mg by mouth every day.     • albuterol (PROAIR HFA) 108 (90 BASE) MCG/ACT Aero Soln inhalation aerosol Inhale 2 Puffs by mouth every four hours as needed for Shortness of Breath (wheezing). 1 Inhaler 1   • alprazolam (XANAX) 0.25 MG TABS Take 1 Tab by mouth at bedtime as needed for Sleep. 30 Tab 3     No facility-administered encounter medications on file as of 10/4/2017.      Review of Systems   Constitutional: Negative for malaise/fatigue.   HENT: Negative.    Respiratory: Negative for shortness of breath.    Cardiovascular: Negative for chest pain, palpitations, orthopnea, leg swelling and PND.   Gastrointestinal: Negative for abdominal pain.   Musculoskeletal: Negative for falls.   Skin: Negative.    Neurological: Negative for dizziness and loss of consciousness.   Endo/Heme/Allergies: Does not bruise/bleed easily.   Psychiatric/Behavioral: Negative for depression.   All other systems reviewed and are negative.       Objective:   /78   Pulse 98   Ht 1.651 m (5' 5\")   Wt 92.1 kg (203 lb)   SpO2 96%   BMI 33.78 kg/m²     Physical Exam   Constitutional: She is oriented to person, place, and time. No distress.   HENT:   Head: Normocephalic and atraumatic.   Eyes: Conjunctivae are normal.   Neck: Normal range of motion. Neck " supple. No JVD present.   Cardiovascular: Normal rate, regular rhythm and normal heart sounds.  Exam reveals no gallop and no friction rub.    No murmur heard.  Pulmonary/Chest: Effort normal and breath sounds normal. No respiratory distress. She has no wheezes. She has no rales.   Abdominal: Soft. There is no tenderness.   Musculoskeletal: She exhibits no edema.   Neurological: She is alert and oriented to person, place, and time.   Skin: Skin is warm and dry. She is not diaphoretic.   Psychiatric: She has a normal mood and affect.   Nursing note and vitals reviewed.    Cardiac catheterization performed in October 2014.   FINDINGS:  HEMODYNAMICS:  RIGHT HEART PRESSURES:  Mean right atrial pressure 1.  Right ventricular pressure 36/5.  Pulmonary artery pressure 33/12, mean of 22.  Mean wedge pressure 14.  Thermodilution cardiac output 6.8 L per minute, cardiac index 3.4 L per minute   per m2.  Yissel cardiac output 6.7 L per minute, cardiac index 3.3 L per minute per m2.  LEFT HEART PRESSURES:  LVEDP of 12, left ventricular systolic pressure 146, central aortic pressure   systolic 150, diastolic 75, mean of 111.  Pulmonary vascular resistance of 0.9 Wood units.  CONCLUSION:  1.  Mild pulmonary hypertension, no evidence of pulmonary arterial   hypertension.  2.  Ejection fraction 71%.  Normal wall motion.  3.  Normal coronary arteries.    Labs from July 2017 was reviewed. Hemoglobin normal. Creatinine normal.    Ziopatch from August 2017 was reviewed.  Normal sinus rhythm with an average heart rate of 83 bpm. 90 runs of SVT were noted. Fastest was 7 beats with a maximum heart rate of 187 bpm. Longest was 17 beats with an average heart rate of 148 bpm. Rare PACs and PVCs.  SVT does not correlate with symptoms.    Assessment:     1. Essential hypertension     2. Atherosclerosis of native coronary artery of native heart without angina pectoris         Medical Decision Making:  Today's Assessment / Status / Plan:      Patient's palpitations have essentially resolved. She does have a history of SVT but appears to have been triggered by her caffeine. Continue to avoid caffeine as much as possible. Continue metoprolol at current dose. No changes for now.    Her blood pressure continues to be well controlled. Continue current medication regimen.     She has a history of obstructive sleep apnea and is compliant on her CPAP.     Return to clinic in 6 months or earlier if needed.    Thank you for allowing me to participate in the care of this patient. Please do not hesitate to contact me with any questions.    Kathie Ya MD  Cardiologist  [facility]      PLEASE NOTE: This dictation was created using voice recognition software.

## 2017-12-01 ENCOUNTER — OFFICE VISIT (OUTPATIENT)
Dept: NEUROLOGY | Facility: MEDICAL CENTER | Age: 72
End: 2017-12-01
Payer: COMMERCIAL

## 2017-12-01 VITALS
BODY MASS INDEX: 34.97 KG/M2 | HEART RATE: 92 BPM | OXYGEN SATURATION: 92 % | HEIGHT: 65 IN | SYSTOLIC BLOOD PRESSURE: 118 MMHG | DIASTOLIC BLOOD PRESSURE: 64 MMHG | TEMPERATURE: 98.9 F | RESPIRATION RATE: 16 BRPM | WEIGHT: 209.9 LBS

## 2017-12-01 DIAGNOSIS — E53.8 VITAMIN B12 DEFICIENCY: ICD-10-CM

## 2017-12-01 PROCEDURE — 99204 OFFICE O/P NEW MOD 45 MIN: CPT | Performed by: PSYCHIATRY & NEUROLOGY

## 2017-12-01 ASSESSMENT — PATIENT HEALTH QUESTIONNAIRE - PHQ9: CLINICAL INTERPRETATION OF PHQ2 SCORE: 0

## 2017-12-01 NOTE — PATIENT INSTRUCTIONS
IMPRESSION:    1. One episode of speech disturbance July 2017-- lasting for hours  2. Head injury 20 years ago  3. One episode of dizziness, trouble of speech 10 years ago  4. Hx of Vit B12 deficiency, HTN, cardiac arrhthymias, asthma    PLAN/RECOMMENDATIONS:    Explain to the patient that the scars in the brain could be secondary to Hx of brain concussion, small strokes etc    The spell at July 2017 could be      A. Migraine variants-- similar to hemiplegia migraine  B. Subtle seizures-- likely with many scars in the brain  C. TIA-- small strokes ( risk factors have been under control)  D. Psychogenic ( unlikely )    We could offer EEG if these spells continue to happen  No driving if any spells of passing out        ________________________________________________________________________            SIGNATURE:  Dana Anderson      CC:  Fam Reed M.D.    7/2017  Nonspecific scars over both hemisphere

## 2017-12-01 NOTE — PROGRESS NOTES
NEUROLOGY NOTE    Referring Physician  Fam Reed M.D.      CHIEF COMPLAINT:  One spell of speech disturbance July 2017      PRESENT ILLNESS:   One spell of speech disturbance July 2017-- Hx of brain concussion 20 years ago    She had another spell of speech disturbance 10 years ago    She also has asthma, cardiac arrhthymias( not atrial fib), mild HNT    Right carotid - Very mild plaque of the carotid bifurcation. Doppler    velocities are normal.      Left carotid - Very mild plaque of the carotid bifurcation. Doppler    velocities are normal.    PAST MEDICAL HISTORY:  Past Medical History:   Diagnosis Date   • Anesthesia     jaw dislocation after bite block   • Arrhythmia     palpitations   • Asthma    • Breath shortness    • CHD (coronary heart disease)    • GERD (gastroesophageal reflux disease)    • Heart burn    • Hiatal hernia    • Hiatus hernia syndrome    • HTN (hypertension)    • Indigestion    • Obesity    • Pulmonary HTN    • Sleep apnea     uses cpap   • Snoring        PAST SURGICAL HISTORY:  Past Surgical History:   Procedure Laterality Date   • RECOVERY  10/15/2014    Performed by Cath-Recovery Surgery at SURGERY SAME DAY [facility] ORS   • KRISTEN BY LAPAROSCOPY     • COLONOSCOPY     • ENDOSCOPY     • LUMPECTOMY     • TONSILLECTOMY         FAMILY HISTORY:  Family History   Problem Relation Age of Onset   • Stroke Mother        SOCIAL HISTORY:  Social History     Social History   • Marital status:      Spouse name: N/A   • Number of children: N/A   • Years of education: N/A     Occupational History   • Not on file.     Social History Main Topics   • Smoking status: Never Smoker   • Smokeless tobacco: Never Used   • Alcohol use Yes      Comment: occ.   • Drug use: Unknown   • Sexual activity: Not on file     Other Topics Concern   • Not on file     Social History Narrative   • No narrative on file     ALLERGIES:  Allergies   Allergen Reactions   • Sulfa Drugs Itching, Swelling and Unspecified      Pt. States face redness, itching, swelling and fever     TOBHX  History   Smoking Status   • Never Smoker   Smokeless Tobacco   • Never Used     ALCHX  History   Alcohol Use   • Yes     Comment: occ.     DRUGHX  History   Drug use: Unknown           MEDICATIONS:  Current Outpatient Prescriptions   Medication   • Omega-3 Fatty Acids (GNP FISH OIL PO)   • budesonide-formoterol (SYMBICORT) 80-4.5 MCG/ACT Aerosol   • clobetasol (TEMOVATE) 0.05 % Ointment   • metoprolol SR (TOPROL XL) 50 MG TABLET SR 24 HR   • lisinopril (PRINIVIL) 5 MG Tab   • CALCIUM PO   • MAGNESIUM PO   • Coenzyme Q10 (COQ10) 100 MG CAPS   • ascorbic acid (ASCORBIC ACID) 500 MG TABS   • azithromycin (ZITHROMAX) 250 MG Tab   • cyanocobalamin (VITAMIN B-12) 1000 MCG/ML Solution   • Cyanocobalamin (VITAMIN B-12 INJ)   • albuterol (PROAIR HFA) 108 (90 BASE) MCG/ACT Aero Soln inhalation aerosol   • alprazolam (XANAX) 0.25 MG TABS   • omeprazole (PRILOSEC) 20 MG CPDR     No current facility-administered medications for this visit.        REVIEW OF SYSTEM:    Constitutional: Denies fevers, Denies weight changes   Eyes: Denies changes in vision, no eye pain   Ears/Nose/Throat/Mouth: Denies nasal congestion or sore throat   Cardiovascular: Denies chest pain or palpitations   Respiratory: Denies SOB.   Gastrointestinal/Hepatic: Denies abdominal pain, nausea, vomiting, diarrhea, constipation or GI bleeding   Genitourinary: Denies bladder dysfunction, dysuria or frequency   Musculoskeletal/Rheum: Denies joint pain and swelling   Skin/Breast: Denies rash, denies breast lumps or discharge   Neurological: spells of speech problems  Psychiatric: denies mood disorder   Endocrine: denies hx of diabetes or thyroid dysfunction   Heme/Oncology/Lymph Nodes: Denies enlarged lymph nodes, denies brusing or known bleeding disorder   Allergic/Immunologic: Denies hx of allergies         PHYSICAL AND NEUROLOGICAL EXMAINATIONS:  VITAL SIGNS: /64   Pulse 92   Temp 37.2  "°C (98.9 °F)   Resp 16   Ht 1.651 m (5' 5\")   Wt 95.2 kg (209 lb 14.4 oz)   SpO2 92%   BMI 34.93 kg/m²   CURRENT WEIGHT: BMI: Body mass index is 34.93 kg/m².  PREVIOUS WEIGHTS:  Wt Readings from Last 25 Encounters:   12/01/17 95.2 kg (209 lb 14.4 oz)   10/04/17 92.1 kg (203 lb)   09/07/17 91.6 kg (202 lb)   07/26/17 93 kg (205 lb)   07/25/17 92.1 kg (203 lb)   03/28/17 93.9 kg (207 lb)   02/28/17 93.4 kg (206 lb)   12/06/16 93.4 kg (206 lb)   10/04/16 94.8 kg (209 lb)   08/29/16 93.4 kg (206 lb)   08/02/16 94.8 kg (209 lb)   06/22/16 91.6 kg (202 lb)   05/31/16 92.1 kg (203 lb)   05/12/16 95.7 kg (211 lb)   05/11/16 95.3 kg (210 lb)   04/16/16 93 kg (205 lb)   04/14/16 94.8 kg (209 lb)   04/07/16 93.4 kg (206 lb)   10/01/15 93 kg (205 lb)   10/22/14 90.7 kg (200 lb)   10/13/14 93 kg (205 lb 0.4 oz)   10/07/14 92.1 kg (203 lb)   08/12/14 89.8 kg (198 lb)   02/19/14 86.2 kg (190 lb)   08/06/13 85.7 kg (189 lb)       General appearance of patient: WDWN(+) NAD(+)    EYES  o Fundus : Papilledem(-) Exudates(-) Hemorrhage(-)  Nervous System  Orientation to time, place and person(+)  Memory normal(-) recall 1/3  Language: aphasia(-)  Knowledge: past(+) Current(+)  Attention(+)  Cranial Nerves  • Nerve 2: intact  • Nerve 3,4,6: intact  • Nerve 5 : intact  • Nerve 7: intact  • Nerve 8: intact  • Nerve 9 & 10: intact  • Nerve 11: intact  • Nerve 12: intact  Muscle Power and muscle tone: symmetric, normal in upper and lower  Sensory System: Pin sensation intact(+)  Reflexes: symmetric throughout  Cerebellar Function FNP normal   Gait : Steady(+) TandemGait steady(+)  Heart and Vascular  Peripheral Vasucular system : Edema (-) Swelling(-)  RHB, Breathing sound clear  abdomen bowel sound normoactive  Extremities freely moveable  Joints no contracture       NEUROIMAGING: I reviewed the MRI/CT of brain       LAB:            IMPRESSION:    1. One episode of speech disturbance July 2017-- lasting for hours  2. Head injury 20 " years ago  3. One episode of dizziness, trouble of speech 10 years ago  4. Hx of Vit B12 deficiency, HTN, cardiac arrhthymias, asthma    PLAN/RECOMMENDATIONS:    Explain to the patient that the scars in the brain could be secondary to Hx of brain concussion, small strokes etc    The spell at July 2017 could be      A. Migraine variants-- similar to hemiplegia migraine  B. Subtle seizures-- likely with many scars in the brain  C. TIA-- small strokes ( risk factors have been under control)  D. Psychogenic ( unlikely )    We could offer EEG if these spells continue to happen  No driving if any spells of passing out        ________________________________________________________________________            SIGNATURE:  Dana Anderson      CC:  Fam Reed M.D.    7/2017  Nonspecific scars over both hemisphere

## 2017-12-04 DIAGNOSIS — I10 ESSENTIAL HYPERTENSION: ICD-10-CM

## 2017-12-04 RX ORDER — METOPROLOL SUCCINATE 50 MG/1
25 TABLET, EXTENDED RELEASE ORAL
Qty: 45 TAB | Refills: 3 | Status: SHIPPED | OUTPATIENT
Start: 2017-12-04

## 2018-01-15 ENCOUNTER — APPOINTMENT (OUTPATIENT)
Dept: PULMONOLOGY | Facility: HOSPICE | Age: 73
End: 2018-01-15
Payer: COMMERCIAL

## 2018-01-18 ENCOUNTER — TELEPHONE (OUTPATIENT)
Dept: PULMONOLOGY | Facility: HOSPICE | Age: 73
End: 2018-01-18

## 2018-01-18 DIAGNOSIS — J45.909 UNCOMPLICATED ASTHMA, UNSPECIFIED ASTHMA SEVERITY, UNSPECIFIED WHETHER PERSISTENT: ICD-10-CM

## 2018-01-18 DIAGNOSIS — G47.33 OSA (OBSTRUCTIVE SLEEP APNEA): ICD-10-CM

## 2018-10-10 ENCOUNTER — APPOINTMENT (RX ONLY)
Dept: URBAN - METROPOLITAN AREA CLINIC 35 | Facility: CLINIC | Age: 73
Setting detail: DERMATOLOGY
End: 2018-10-10

## 2018-10-10 DIAGNOSIS — L90.0 LICHEN SCLEROSUS ET ATROPHICUS: ICD-10-CM

## 2018-10-10 DIAGNOSIS — Z71.89 OTHER SPECIFIED COUNSELING: ICD-10-CM

## 2018-10-10 DIAGNOSIS — L57.0 ACTINIC KERATOSIS: ICD-10-CM

## 2018-10-10 PROCEDURE — 17000 DESTRUCT PREMALG LESION: CPT

## 2018-10-10 PROCEDURE — 99214 OFFICE O/P EST MOD 30 MIN: CPT | Mod: 25

## 2018-10-10 PROCEDURE — ? RECOMMENDATIONS

## 2018-10-10 PROCEDURE — ? LIQUID NITROGEN

## 2018-10-10 PROCEDURE — ? TREATMENT REGIMEN

## 2018-10-10 PROCEDURE — ? COUNSELING

## 2018-10-10 PROCEDURE — 17003 DESTRUCT PREMALG LES 2-14: CPT

## 2018-10-10 ASSESSMENT — LOCATION SIMPLE DESCRIPTION DERM
LOCATION SIMPLE: VESTIBULE
LOCATION SIMPLE: RIGHT TEMPLE
LOCATION SIMPLE: ANUS
LOCATION SIMPLE: CHEST
LOCATION SIMPLE: MONS PUBIS
LOCATION SIMPLE: LEFT CHEEK
LOCATION SIMPLE: LABIA MINORA
LOCATION SIMPLE: LEFT EYEBROW
LOCATION SIMPLE: LABIA MAJORA

## 2018-10-10 ASSESSMENT — LOCATION DETAILED DESCRIPTION DERM
LOCATION DETAILED: LEFT PERIANAL SKIN
LOCATION DETAILED: LEFT SUPERIOR LATERAL BUCCAL CHEEK
LOCATION DETAILED: RIGHT CENTRAL TEMPLE
LOCATION DETAILED: SUPERIOR PERIANAL SKIN
LOCATION DETAILED: VESTIBULE
LOCATION DETAILED: MIDDLE STERNUM
LOCATION DETAILED: RIGHT LABIUM MINUS
LOCATION DETAILED: LEFT LATERAL EYEBROW
LOCATION DETAILED: LEFT MONS PUBIS
LOCATION DETAILED: RIGHT LABIUM MAJUS
LOCATION DETAILED: LEFT LABIUM MAJUS

## 2018-10-10 ASSESSMENT — LOCATION ZONE DERM
LOCATION ZONE: VULVA
LOCATION ZONE: FACE
LOCATION ZONE: ANUS
LOCATION ZONE: TRUNK
LOCATION ZONE: VAGINA

## 2018-10-10 NOTE — HPI: RASH (LICHEN SCLEROSUS ET ATROPHICUS)
How Severe Is It?: moderate
Is This A New Presentation, Or A Follow-Up?: Follow Up Lichen Sclerosus et Atrophicus
Additional History: I can use clobetasol for 2 weeks and it is controlled but as soon as I stop it, it comes back.  Doesn’t matter how I use it

## 2018-10-10 NOTE — PROCEDURE: RECOMMENDATIONS
Recommendation Preamble: The following recommendations were made during the visit:
Recommendations (Free Text): CO2 laser with gynecologist
Detail Level: Zone

## 2018-10-10 NOTE — PROCEDURE: LIQUID NITROGEN
Render Post-Care Instructions In Note?: no
Duration Of Freeze Thaw-Cycle (Seconds): 2
Consent: The patient's consent was obtained including but not limited to risks of crusting, scabbing, blistering, scarring, darker or lighter pigmentary change, recurrence, incomplete removal and infection.
Post-Care Instructions: I reviewed with the patient in detail post-care instructions. Patient is to wear sunprotection, and avoid picking at any of the treated lesions. Pt may apply Vaseline to crusted or scabbing areas.
Detail Level: Detailed
Number Of Freeze-Thaw Cycles: 2 freeze-thaw cycles

## 2019-04-16 ENCOUNTER — APPOINTMENT (RX ONLY)
Dept: URBAN - METROPOLITAN AREA CLINIC 35 | Facility: CLINIC | Age: 74
Setting detail: DERMATOLOGY
End: 2019-04-16

## 2019-04-16 DIAGNOSIS — Z71.89 OTHER SPECIFIED COUNSELING: ICD-10-CM

## 2019-04-16 DIAGNOSIS — L90.0 LICHEN SCLEROSUS ET ATROPHICUS: ICD-10-CM

## 2019-04-16 DIAGNOSIS — D22 MELANOCYTIC NEVI: ICD-10-CM

## 2019-04-16 DIAGNOSIS — L57.0 ACTINIC KERATOSIS: ICD-10-CM

## 2019-04-16 DIAGNOSIS — L82.1 OTHER SEBORRHEIC KERATOSIS: ICD-10-CM

## 2019-04-16 DIAGNOSIS — D18.0 HEMANGIOMA: ICD-10-CM

## 2019-04-16 DIAGNOSIS — L71.8 OTHER ROSACEA: ICD-10-CM

## 2019-04-16 DIAGNOSIS — L81.4 OTHER MELANIN HYPERPIGMENTATION: ICD-10-CM

## 2019-04-16 DIAGNOSIS — L72.0 EPIDERMAL CYST: ICD-10-CM

## 2019-04-16 PROBLEM — D18.01 HEMANGIOMA OF SKIN AND SUBCUTANEOUS TISSUE: Status: ACTIVE | Noted: 2019-04-16

## 2019-04-16 PROBLEM — D22.39 MELANOCYTIC NEVI OF OTHER PARTS OF FACE: Status: ACTIVE | Noted: 2019-04-16

## 2019-04-16 PROCEDURE — ? OBSERVATION AND MEASURE

## 2019-04-16 PROCEDURE — ? LIQUID NITROGEN

## 2019-04-16 PROCEDURE — 99213 OFFICE O/P EST LOW 20 MIN: CPT | Mod: 25

## 2019-04-16 PROCEDURE — ? COUNSELING

## 2019-04-16 PROCEDURE — 17000 DESTRUCT PREMALG LESION: CPT

## 2019-04-16 PROCEDURE — 17003 DESTRUCT PREMALG LES 2-14: CPT

## 2019-04-16 PROCEDURE — ? TREATMENT REGIMEN

## 2019-04-16 ASSESSMENT — LOCATION SIMPLE DESCRIPTION DERM
LOCATION SIMPLE: LEFT CHEEK
LOCATION SIMPLE: ABDOMEN
LOCATION SIMPLE: LEFT TEMPLE
LOCATION SIMPLE: LEFT PRETIBIAL REGION
LOCATION SIMPLE: RIGHT CALF
LOCATION SIMPLE: VESTIBULE
LOCATION SIMPLE: LEFT EYEBROW
LOCATION SIMPLE: LEFT FOREHEAD
LOCATION SIMPLE: RIGHT PRETIBIAL REGION
LOCATION SIMPLE: LEFT LOWER BACK
LOCATION SIMPLE: LABIA MINORA
LOCATION SIMPLE: LEFT THIGH
LOCATION SIMPLE: LABIA MAJORA
LOCATION SIMPLE: MONS PUBIS
LOCATION SIMPLE: ANUS
LOCATION SIMPLE: RIGHT THIGH
LOCATION SIMPLE: RIGHT FOREHEAD
LOCATION SIMPLE: RIGHT CHEEK
LOCATION SIMPLE: LEFT POSTERIOR THIGH

## 2019-04-16 ASSESSMENT — LOCATION DETAILED DESCRIPTION DERM
LOCATION DETAILED: RIGHT ANTERIOR DISTAL THIGH
LOCATION DETAILED: RIGHT INFERIOR CENTRAL MALAR CHEEK
LOCATION DETAILED: RIGHT DISTAL PRETIBIAL REGION
LOCATION DETAILED: RIGHT LABIUM MINUS
LOCATION DETAILED: RIGHT ANTERIOR PROXIMAL THIGH
LOCATION DETAILED: LEFT SUPERIOR LATERAL LOWER BACK
LOCATION DETAILED: LEFT ANTERIOR PROXIMAL THIGH
LOCATION DETAILED: LEFT PROXIMAL PRETIBIAL REGION
LOCATION DETAILED: LEFT MONS PUBIS
LOCATION DETAILED: RIGHT SUPERIOR MEDIAL FOREHEAD
LOCATION DETAILED: LEFT PERIANAL SKIN
LOCATION DETAILED: RIGHT FOREHEAD
LOCATION DETAILED: LEFT PROXIMAL POSTERIOR THIGH
LOCATION DETAILED: LEFT CENTRAL MALAR CHEEK
LOCATION DETAILED: RIGHT PROXIMAL PRETIBIAL REGION
LOCATION DETAILED: RIGHT DISTAL CALF
LOCATION DETAILED: SUPERIOR PERIANAL SKIN
LOCATION DETAILED: VESTIBULE
LOCATION DETAILED: PERIUMBILICAL SKIN
LOCATION DETAILED: LEFT DISTAL POSTERIOR THIGH
LOCATION DETAILED: RIGHT SUPERIOR FOREHEAD
LOCATION DETAILED: LEFT LABIUM MAJUS
LOCATION DETAILED: LEFT CENTRAL TEMPLE
LOCATION DETAILED: RIGHT LABIUM MAJUS
LOCATION DETAILED: LEFT LATERAL EYEBROW
LOCATION DETAILED: LEFT FOREHEAD

## 2019-04-16 ASSESSMENT — LOCATION ZONE DERM
LOCATION ZONE: VULVA
LOCATION ZONE: VAGINA
LOCATION ZONE: FACE
LOCATION ZONE: TRUNK
LOCATION ZONE: LEG
LOCATION ZONE: ANUS

## 2019-04-16 NOTE — PROCEDURE: LIQUID NITROGEN
Post-Care Instructions: I reviewed with the patient in detail post-care instructions. Patient is to wear sunprotection, and avoid picking at any of the treated lesions. Pt may apply Vaseline to crusted or scabbing areas.
Number Of Freeze-Thaw Cycles: 2 freeze-thaw cycles
Detail Level: Detailed
Consent: The patient's consent was obtained including but not limited to risks of crusting, scabbing, blistering, scarring, darker or lighter pigmentary change, recurrence, incomplete removal and infection.
Render Post-Care Instructions In Note?: no
Duration Of Freeze Thaw-Cycle (Seconds): 2

## 2019-07-29 ENCOUNTER — APPOINTMENT (RX ONLY)
Dept: URBAN - METROPOLITAN AREA CLINIC 35 | Facility: CLINIC | Age: 74
Setting detail: DERMATOLOGY
End: 2019-07-29

## 2019-07-29 DIAGNOSIS — L30.9 DERMATITIS, UNSPECIFIED: ICD-10-CM

## 2019-07-29 PROCEDURE — ? TREATMENT REGIMEN

## 2019-07-29 PROCEDURE — 99213 OFFICE O/P EST LOW 20 MIN: CPT

## 2019-07-29 PROCEDURE — ? COUNSELING

## 2019-07-29 ASSESSMENT — LOCATION SIMPLE DESCRIPTION DERM: LOCATION SIMPLE: RIGHT PRETIBIAL REGION

## 2019-07-29 ASSESSMENT — LOCATION ZONE DERM: LOCATION ZONE: LEG

## 2019-07-29 ASSESSMENT — LOCATION DETAILED DESCRIPTION DERM: LOCATION DETAILED: RIGHT DISTAL PRETIBIAL REGION

## 2019-08-12 ENCOUNTER — APPOINTMENT (RX ONLY)
Dept: URBAN - METROPOLITAN AREA CLINIC 35 | Facility: CLINIC | Age: 74
Setting detail: DERMATOLOGY
End: 2019-08-12

## 2019-08-12 DIAGNOSIS — L30.9 DERMATITIS, UNSPECIFIED: ICD-10-CM | Status: IMPROVED

## 2019-08-12 PROCEDURE — ? COUNSELING

## 2019-08-12 PROCEDURE — 99212 OFFICE O/P EST SF 10 MIN: CPT

## 2019-08-12 ASSESSMENT — LOCATION SIMPLE DESCRIPTION DERM: LOCATION SIMPLE: RIGHT PRETIBIAL REGION

## 2019-08-12 ASSESSMENT — LOCATION ZONE DERM: LOCATION ZONE: LEG

## 2019-08-12 ASSESSMENT — LOCATION DETAILED DESCRIPTION DERM: LOCATION DETAILED: RIGHT DISTAL PRETIBIAL REGION

## 2020-07-02 ENCOUNTER — RX ONLY (OUTPATIENT)
Age: 75
Setting detail: RX ONLY
End: 2020-07-02

## 2020-07-02 RX ORDER — CLOBETASOL PROPIONATE 0.5 MG/G
1 OINTMENT TOPICAL DAILY
Qty: 1 | Refills: 3 | Status: ERX

## 2021-01-13 DIAGNOSIS — Z23 NEED FOR VACCINATION: ICD-10-CM

## 2021-02-23 ENCOUNTER — IMMUNIZATION (OUTPATIENT)
Dept: FAMILY PLANNING/WOMEN'S HEALTH CLINIC | Facility: IMMUNIZATION CENTER | Age: 76
End: 2021-02-23
Payer: COMMERCIAL

## 2021-02-23 ENCOUNTER — APPOINTMENT (OUTPATIENT)
Dept: FAMILY PLANNING/WOMEN'S HEALTH CLINIC | Facility: IMMUNIZATION CENTER | Age: 76
End: 2021-02-23
Attending: INTERNAL MEDICINE
Payer: COMMERCIAL

## 2021-02-23 DIAGNOSIS — Z23 NEED FOR VACCINATION: ICD-10-CM

## 2021-02-23 DIAGNOSIS — Z23 ENCOUNTER FOR VACCINATION: Primary | ICD-10-CM

## 2021-02-23 PROCEDURE — 91300 PFIZER SARS-COV-2 VACCINE: CPT

## 2021-02-23 PROCEDURE — 0001A PFIZER SARS-COV-2 VACCINE: CPT

## 2021-03-13 ENCOUNTER — IMMUNIZATION (OUTPATIENT)
Dept: FAMILY PLANNING/WOMEN'S HEALTH CLINIC | Facility: IMMUNIZATION CENTER | Age: 76
End: 2021-03-13
Attending: INTERNAL MEDICINE
Payer: COMMERCIAL

## 2021-03-13 DIAGNOSIS — Z23 ENCOUNTER FOR VACCINATION: Primary | ICD-10-CM

## 2021-03-13 PROCEDURE — 0002A PFIZER SARS-COV-2 VACCINE: CPT

## 2021-03-13 PROCEDURE — 91300 PFIZER SARS-COV-2 VACCINE: CPT

## 2021-07-09 ENCOUNTER — RX ONLY (OUTPATIENT)
Age: 76
Setting detail: RX ONLY
End: 2021-07-09

## 2021-07-09 RX ORDER — CLOBETASOL PROPIONATE 0.5 MG/G
OINTMENT TOPICAL DAILY
Qty: 1 | Refills: 3 | Status: ERX

## 2021-09-22 ENCOUNTER — APPOINTMENT (RX ONLY)
Dept: URBAN - METROPOLITAN AREA CLINIC 35 | Facility: CLINIC | Age: 76
Setting detail: DERMATOLOGY
End: 2021-09-22

## 2021-09-22 DIAGNOSIS — D22 MELANOCYTIC NEVI: ICD-10-CM

## 2021-09-22 DIAGNOSIS — L90.0 LICHEN SCLEROSUS ET ATROPHICUS: ICD-10-CM | Status: INADEQUATELY CONTROLLED

## 2021-09-22 DIAGNOSIS — Z71.89 OTHER SPECIFIED COUNSELING: ICD-10-CM

## 2021-09-22 DIAGNOSIS — L82.1 OTHER SEBORRHEIC KERATOSIS: ICD-10-CM

## 2021-09-22 DIAGNOSIS — D18.0 HEMANGIOMA: ICD-10-CM

## 2021-09-22 DIAGNOSIS — L81.4 OTHER MELANIN HYPERPIGMENTATION: ICD-10-CM

## 2021-09-22 DIAGNOSIS — L57.0 ACTINIC KERATOSIS: ICD-10-CM

## 2021-09-22 PROBLEM — D22.5 MELANOCYTIC NEVI OF TRUNK: Status: ACTIVE | Noted: 2021-09-22

## 2021-09-22 PROBLEM — D18.01 HEMANGIOMA OF SKIN AND SUBCUTANEOUS TISSUE: Status: ACTIVE | Noted: 2021-09-22

## 2021-09-22 PROBLEM — D22.39 MELANOCYTIC NEVI OF OTHER PARTS OF FACE: Status: ACTIVE | Noted: 2021-09-22

## 2021-09-22 PROCEDURE — 17000 DESTRUCT PREMALG LESION: CPT

## 2021-09-22 PROCEDURE — 17003 DESTRUCT PREMALG LES 2-14: CPT

## 2021-09-22 PROCEDURE — ? LIQUID NITROGEN

## 2021-09-22 PROCEDURE — ? TREATMENT REGIMEN

## 2021-09-22 PROCEDURE — ? OBSERVATION AND MEASURE

## 2021-09-22 PROCEDURE — 99214 OFFICE O/P EST MOD 30 MIN: CPT | Mod: 25

## 2021-09-22 PROCEDURE — ? SUNSCREEN RECOMMENDATIONS

## 2021-09-22 PROCEDURE — ? COUNSELING

## 2021-09-22 PROCEDURE — ? PRESCRIPTION

## 2021-09-22 RX ORDER — CLOBETASOL PROPIONATE 0.5 MG/G
1 OINTMENT TOPICAL DAILY
Qty: 60 | Refills: 3 | Status: ERX

## 2021-09-22 ASSESSMENT — LOCATION DETAILED DESCRIPTION DERM
LOCATION DETAILED: NASAL DORSUM
LOCATION DETAILED: LEFT SUPERIOR LATERAL LOWER BACK
LOCATION DETAILED: RIGHT DISTAL PRETIBIAL REGION
LOCATION DETAILED: RIGHT NASAL DORSUM
LOCATION DETAILED: LEFT MONS PUBIS
LOCATION DETAILED: RIGHT DISTAL CALF
LOCATION DETAILED: LEFT PERIANAL SKIN
LOCATION DETAILED: RIGHT MEDIAL SUPERIOR CHEST
LOCATION DETAILED: RIGHT LABIUM MINUS
LOCATION DETAILED: PERIUMBILICAL SKIN
LOCATION DETAILED: RIGHT ANTERIOR DISTAL THIGH
LOCATION DETAILED: LEFT LATERAL EYEBROW
LOCATION DETAILED: LEFT DISTAL POSTERIOR THIGH
LOCATION DETAILED: LEFT MEDIAL UPPER BACK
LOCATION DETAILED: LEFT LABIUM MAJUS

## 2021-09-22 ASSESSMENT — LOCATION SIMPLE DESCRIPTION DERM
LOCATION SIMPLE: LEFT UPPER BACK
LOCATION SIMPLE: NOSE
LOCATION SIMPLE: LABIA MAJORA
LOCATION SIMPLE: RIGHT CALF
LOCATION SIMPLE: CHEST
LOCATION SIMPLE: LABIA MINORA
LOCATION SIMPLE: RIGHT THIGH
LOCATION SIMPLE: LEFT LOWER BACK
LOCATION SIMPLE: RIGHT PRETIBIAL REGION
LOCATION SIMPLE: ANUS
LOCATION SIMPLE: MONS PUBIS
LOCATION SIMPLE: LEFT POSTERIOR THIGH
LOCATION SIMPLE: LEFT EYEBROW
LOCATION SIMPLE: ABDOMEN

## 2021-09-22 ASSESSMENT — LOCATION ZONE DERM
LOCATION ZONE: FACE
LOCATION ZONE: NOSE
LOCATION ZONE: VULVA
LOCATION ZONE: TRUNK
LOCATION ZONE: ANUS
LOCATION ZONE: LEG

## 2021-09-22 NOTE — PROCEDURE: LIQUID NITROGEN
Render Post-Care Instructions In Note?: no
Consent: The patient's consent was obtained including but not limited to risks of crusting, scabbing, blistering, scarring, darker or lighter pigmentary change, recurrence, incomplete removal and infection.
Duration Of Freeze Thaw-Cycle (Seconds): 2
Show Applicator Variable?: Yes
Detail Level: Detailed
Number Of Freeze-Thaw Cycles: 2 freeze-thaw cycles
Post-Care Instructions: I reviewed with the patient in detail post-care instructions. Patient is to wear sunprotection, and avoid picking at any of the treated lesions. Pt may apply Vaseline to crusted or scabbing areas.

## 2021-09-22 NOTE — PROCEDURE: TREATMENT REGIMEN
Plan: Continue with [facility] tx with GYN\\nAlso use [facility]
Otc Regimen: AnteAge vaginal rejuvenation
Detail Level: Zone
Continue Regimen: Clobetasol ointment twice a day

## 2022-06-29 NOTE — HPI: SKIN LESION
Patient is coming into the [employer]  The TSH and T4 failed medical necessity for medicare
Is This A New Presentation, Or A Follow-Up?: Skin Lesion
What Type Of Note Output Would You Prefer (Optional)?: Bullet Format
How Severe Is Your Skin Lesion?: mild
Has Your Skin Lesion Been Treated?: not been treated
Additional History: Went to [place] in April and had spot, it felt like just dry skin.  Then became really itchy

## 2022-12-05 ENCOUNTER — APPOINTMENT (RX ONLY)
Dept: URBAN - METROPOLITAN AREA CLINIC 6 | Facility: CLINIC | Age: 77
Setting detail: DERMATOLOGY
End: 2022-12-05

## 2022-12-05 DIAGNOSIS — L72.0 EPIDERMAL CYST: ICD-10-CM

## 2022-12-05 DIAGNOSIS — L90.0 LICHEN SCLEROSUS ET ATROPHICUS: ICD-10-CM

## 2022-12-05 DIAGNOSIS — D18.0 HEMANGIOMA: ICD-10-CM

## 2022-12-05 DIAGNOSIS — L82.1 OTHER SEBORRHEIC KERATOSIS: ICD-10-CM

## 2022-12-05 DIAGNOSIS — L73.8 OTHER SPECIFIED FOLLICULAR DISORDERS: ICD-10-CM

## 2022-12-05 DIAGNOSIS — Z71.89 OTHER SPECIFIED COUNSELING: ICD-10-CM

## 2022-12-05 DIAGNOSIS — D22 MELANOCYTIC NEVI: ICD-10-CM

## 2022-12-05 DIAGNOSIS — L81.4 OTHER MELANIN HYPERPIGMENTATION: ICD-10-CM

## 2022-12-05 PROBLEM — D22.71 MELANOCYTIC NEVI OF RIGHT LOWER LIMB, INCLUDING HIP: Status: ACTIVE | Noted: 2022-12-05

## 2022-12-05 PROBLEM — D18.01 HEMANGIOMA OF SKIN AND SUBCUTANEOUS TISSUE: Status: ACTIVE | Noted: 2022-12-05

## 2022-12-05 PROBLEM — D22.62 MELANOCYTIC NEVI OF LEFT UPPER LIMB, INCLUDING SHOULDER: Status: ACTIVE | Noted: 2022-12-05

## 2022-12-05 PROBLEM — D22.5 MELANOCYTIC NEVI OF TRUNK: Status: ACTIVE | Noted: 2022-12-05

## 2022-12-05 PROBLEM — D22.72 MELANOCYTIC NEVI OF LEFT LOWER LIMB, INCLUDING HIP: Status: ACTIVE | Noted: 2022-12-05

## 2022-12-05 PROBLEM — D22.61 MELANOCYTIC NEVI OF RIGHT UPPER LIMB, INCLUDING SHOULDER: Status: ACTIVE | Noted: 2022-12-05

## 2022-12-05 PROCEDURE — 99214 OFFICE O/P EST MOD 30 MIN: CPT

## 2022-12-05 PROCEDURE — ? COUNSELING

## 2022-12-05 PROCEDURE — ? TREATMENT REGIMEN

## 2022-12-05 PROCEDURE — ? PRESCRIPTION

## 2022-12-05 RX ORDER — CLOBETASOL PROPIONATE 0.5 MG/G
1 OINTMENT TOPICAL DAILY
Qty: 60 | Refills: 3 | Status: ERX | COMMUNITY
Start: 2022-12-05

## 2022-12-05 RX ADMIN — CLOBETASOL PROPIONATE 1: 0.5 OINTMENT TOPICAL at 00:00

## 2022-12-05 ASSESSMENT — LOCATION SIMPLE DESCRIPTION DERM
LOCATION SIMPLE: ANUS
LOCATION SIMPLE: ABDOMEN
LOCATION SIMPLE: LEFT FOREARM
LOCATION SIMPLE: RIGHT PRETIBIAL REGION
LOCATION SIMPLE: RIGHT FOREARM
LOCATION SIMPLE: MONS PUBIS
LOCATION SIMPLE: LEFT FOREHEAD
LOCATION SIMPLE: RIGHT UPPER ARM
LOCATION SIMPLE: LEFT THIGH
LOCATION SIMPLE: LEFT UPPER ARM
LOCATION SIMPLE: LABIA MAJORA
LOCATION SIMPLE: LEFT KNEE
LOCATION SIMPLE: LEFT PRETIBIAL REGION
LOCATION SIMPLE: RIGHT KNEE
LOCATION SIMPLE: LABIA MINORA
LOCATION SIMPLE: LEFT BREAST
LOCATION SIMPLE: CHEST
LOCATION SIMPLE: RIGHT THIGH

## 2022-12-05 ASSESSMENT — LOCATION DETAILED DESCRIPTION DERM
LOCATION DETAILED: PERIUMBILICAL SKIN
LOCATION DETAILED: LEFT MEDIAL BREAST 10-11:00 REGION
LOCATION DETAILED: LEFT ANTERIOR DISTAL THIGH
LOCATION DETAILED: LEFT KNEE
LOCATION DETAILED: LEFT PERIANAL SKIN
LOCATION DETAILED: RIGHT LABIUM MINUS
LOCATION DETAILED: RIGHT ANTECUBITAL SKIN
LOCATION DETAILED: LEFT INFERIOR MEDIAL FOREHEAD
LOCATION DETAILED: LEFT LABIUM MAJUS
LOCATION DETAILED: RIGHT PROXIMAL PRETIBIAL REGION
LOCATION DETAILED: RIGHT ANTERIOR PROXIMAL UPPER ARM
LOCATION DETAILED: LEFT VENTRAL PROXIMAL FOREARM
LOCATION DETAILED: LEFT MEDIAL FOREHEAD
LOCATION DETAILED: EPIGASTRIC SKIN
LOCATION DETAILED: RIGHT ANTERIOR DISTAL UPPER ARM
LOCATION DETAILED: LEFT MONS PUBIS
LOCATION DETAILED: RIGHT KNEE
LOCATION DETAILED: RIGHT VENTRAL PROXIMAL FOREARM
LOCATION DETAILED: LOWER STERNUM
LOCATION DETAILED: LEFT ANTERIOR PROXIMAL UPPER ARM
LOCATION DETAILED: LEFT ANTERIOR DISTAL UPPER ARM
LOCATION DETAILED: RIGHT ANTERIOR DISTAL THIGH
LOCATION DETAILED: LEFT PROXIMAL PRETIBIAL REGION

## 2022-12-05 ASSESSMENT — LOCATION ZONE DERM
LOCATION ZONE: FACE
LOCATION ZONE: VULVA
LOCATION ZONE: ARM
LOCATION ZONE: TRUNK
LOCATION ZONE: LEG
LOCATION ZONE: ANUS

## 2022-12-05 NOTE — PROCEDURE: MIPS QUALITY
Quality 431: Preventive Care And Screening: Unhealthy Alcohol Use - Screening: Patient not identified as an unhealthy alcohol user when screened for unhealthy alcohol use using a systematic screening method
Quality 226: Preventive Care And Screening: Tobacco Use: Screening And Cessation Intervention: Patient screened for tobacco use and is an ex/non-smoker
Quality 111:Pneumonia Vaccination Status For Older Adults: Pneumococcal vaccine (PPSV23) administered on or after patient’s 60th birthday and before the end of the measurement period
Quality 130: Documentation Of Current Medications In The Medical Record: Current Medications Documented
Detail Level: Detailed

## 2023-10-10 NOTE — HPI: SKIN LESION
Patient need a follow-up post op in 4-weeks, where would you like her to be scheduled?  
Is This A New Presentation, Or A Follow-Up?: Skin Lesion
What Type Of Note Output Would You Prefer (Optional)?: Standard Output
How Severe Is Your Skin Lesion?: mild
Has Your Skin Lesion Been Treated?: not been treated

## 2024-07-29 NOTE — PROCEDURE: MIPS QUALITY
Quality 226: Preventive Care And Screening: Tobacco Use: Screening And Cessation Intervention: Patient screened for tobacco use and is an ex/non-smoker
Detail Level: Detailed
Quality 130: Documentation Of Current Medications In The Medical Record: Current Medications Documented
none